# Patient Record
Sex: MALE | Race: BLACK OR AFRICAN AMERICAN | NOT HISPANIC OR LATINO
[De-identification: names, ages, dates, MRNs, and addresses within clinical notes are randomized per-mention and may not be internally consistent; named-entity substitution may affect disease eponyms.]

---

## 2017-12-12 PROBLEM — Z00.00 ENCOUNTER FOR PREVENTIVE HEALTH EXAMINATION: Status: ACTIVE | Noted: 2017-12-12

## 2017-12-13 ENCOUNTER — APPOINTMENT (OUTPATIENT)
Dept: UROLOGY | Facility: CLINIC | Age: 77
End: 2017-12-13
Payer: MEDICARE

## 2017-12-13 VITALS
HEIGHT: 70 IN | DIASTOLIC BLOOD PRESSURE: 78 MMHG | SYSTOLIC BLOOD PRESSURE: 138 MMHG | BODY MASS INDEX: 26.34 KG/M2 | WEIGHT: 184 LBS | HEART RATE: 84 BPM | TEMPERATURE: 97.7 F | RESPIRATION RATE: 16 BRPM

## 2017-12-13 DIAGNOSIS — Z87.891 PERSONAL HISTORY OF NICOTINE DEPENDENCE: ICD-10-CM

## 2017-12-13 DIAGNOSIS — Z78.9 OTHER SPECIFIED HEALTH STATUS: ICD-10-CM

## 2017-12-13 DIAGNOSIS — Z85.46 PERSONAL HISTORY OF MALIGNANT NEOPLASM OF PROSTATE: ICD-10-CM

## 2017-12-13 DIAGNOSIS — Z86.79 PERSONAL HISTORY OF OTHER DISEASES OF THE CIRCULATORY SYSTEM: ICD-10-CM

## 2017-12-13 PROCEDURE — 99214 OFFICE O/P EST MOD 30 MIN: CPT

## 2017-12-13 RX ORDER — TAMSULOSIN HYDROCHLORIDE 0.4 MG/1
0.4 CAPSULE ORAL
Qty: 90 | Refills: 3 | Status: ACTIVE | COMMUNITY
Start: 2017-12-13 | End: 1900-01-01

## 2017-12-15 PROBLEM — Z85.46 HISTORY OF MALIGNANT NEOPLASM OF PROSTATE: Status: RESOLVED | Noted: 2017-12-13 | Resolved: 2017-12-15

## 2017-12-15 PROBLEM — Z78.9 SOCIAL ALCOHOL USE: Status: ACTIVE | Noted: 2017-12-13

## 2017-12-15 PROBLEM — Z86.79 HISTORY OF HYPERTENSION: Status: RESOLVED | Noted: 2017-12-13 | Resolved: 2017-12-15

## 2017-12-15 PROBLEM — Z87.891 FORMER SMOKER: Status: ACTIVE | Noted: 2017-12-13

## 2017-12-15 RX ORDER — HYDROCORTISONE 2.5% 25 MG/G
2.5 CREAM TOPICAL
Qty: 30 | Refills: 0 | Status: ACTIVE | COMMUNITY
Start: 2017-07-03

## 2017-12-15 RX ORDER — SIMVASTATIN 40 MG/1
TABLET, FILM COATED ORAL
Refills: 0 | Status: ACTIVE | COMMUNITY

## 2017-12-15 RX ORDER — DILTIAZEM HYDROCHLORIDE 60 MG/1
TABLET, COATED ORAL
Refills: 0 | Status: ACTIVE | COMMUNITY

## 2017-12-15 RX ORDER — TAMSULOSIN HYDROCHLORIDE 0.4 MG/1
0.4 CAPSULE ORAL
Qty: 30 | Refills: 0 | Status: ACTIVE | COMMUNITY
Start: 2017-06-21

## 2017-12-15 RX ORDER — SIMVASTATIN 10 MG/1
10 TABLET, FILM COATED ORAL
Qty: 90 | Refills: 0 | Status: ACTIVE | COMMUNITY
Start: 2017-08-04

## 2018-04-13 ENCOUNTER — APPOINTMENT (OUTPATIENT)
Dept: UROLOGY | Facility: CLINIC | Age: 78
End: 2018-04-13
Payer: MEDICARE

## 2018-04-13 VITALS — WEIGHT: 185 LBS | BODY MASS INDEX: 26.48 KG/M2 | HEIGHT: 70 IN

## 2018-04-13 PROCEDURE — 99213 OFFICE O/P EST LOW 20 MIN: CPT

## 2018-08-01 ENCOUNTER — INPATIENT (INPATIENT)
Facility: HOSPITAL | Age: 78
LOS: 5 days | Discharge: HOME | DRG: 378 | End: 2018-08-07
Attending: HOSPITALIST | Admitting: HOSPITALIST
Payer: COMMERCIAL

## 2018-08-01 VITALS
WEIGHT: 184.97 LBS | HEIGHT: 70 IN | HEART RATE: 103 BPM | DIASTOLIC BLOOD PRESSURE: 90 MMHG | TEMPERATURE: 98 F | OXYGEN SATURATION: 98 % | RESPIRATION RATE: 16 BRPM | SYSTOLIC BLOOD PRESSURE: 179 MMHG

## 2018-08-01 DIAGNOSIS — K62.5 HEMORRHAGE OF ANUS AND RECTUM: ICD-10-CM

## 2018-08-01 DIAGNOSIS — I10 ESSENTIAL (PRIMARY) HYPERTENSION: ICD-10-CM

## 2018-08-01 DIAGNOSIS — Z29.9 ENCOUNTER FOR PROPHYLACTIC MEASURES, UNSPECIFIED: ICD-10-CM

## 2018-08-01 DIAGNOSIS — K57.90 DIVERTICULOSIS OF INTESTINE, PART UNSPECIFIED, WITHOUT PERFORATION OR ABSCESS WITHOUT BLEEDING: ICD-10-CM

## 2018-08-01 DIAGNOSIS — K92.2 GASTROINTESTINAL HEMORRHAGE, UNSPECIFIED: ICD-10-CM

## 2018-08-01 LAB
24R-OH-CALCIDIOL SERPL-MCNC: 35.1 NG/ML — SIGNIFICANT CHANGE UP (ref 30–80)
24R-OH-CALCIDIOL SERPL-MCNC: 35.6 NG/ML — SIGNIFICANT CHANGE UP (ref 30–80)
ALBUMIN SERPL ELPH-MCNC: 3.8 G/DL — SIGNIFICANT CHANGE UP (ref 3.5–5)
ALBUMIN SERPL ELPH-MCNC: 4.1 G/DL — SIGNIFICANT CHANGE UP (ref 3.5–5)
ALP SERPL-CCNC: 49 U/L — SIGNIFICANT CHANGE UP (ref 40–120)
ALP SERPL-CCNC: 55 U/L — SIGNIFICANT CHANGE UP (ref 40–120)
ALT FLD-CCNC: 18 U/L DA — SIGNIFICANT CHANGE UP (ref 10–60)
ALT FLD-CCNC: 18 U/L DA — SIGNIFICANT CHANGE UP (ref 10–60)
ANION GAP SERPL CALC-SCNC: 6 MMOL/L — SIGNIFICANT CHANGE UP (ref 5–17)
ANION GAP SERPL CALC-SCNC: 7 MMOL/L — SIGNIFICANT CHANGE UP (ref 5–17)
APTT BLD: 31.4 SEC — SIGNIFICANT CHANGE UP (ref 27.5–37.4)
AST SERPL-CCNC: 17 U/L — SIGNIFICANT CHANGE UP (ref 10–40)
AST SERPL-CCNC: 18 U/L — SIGNIFICANT CHANGE UP (ref 10–40)
BASOPHILS # BLD AUTO: 0 K/UL — SIGNIFICANT CHANGE UP (ref 0–0.2)
BASOPHILS # BLD AUTO: 0.1 K/UL — SIGNIFICANT CHANGE UP (ref 0–0.2)
BASOPHILS NFR BLD AUTO: 0.8 % — SIGNIFICANT CHANGE UP (ref 0–2)
BASOPHILS NFR BLD AUTO: 0.9 % — SIGNIFICANT CHANGE UP (ref 0–2)
BILIRUB SERPL-MCNC: 0.3 MG/DL — SIGNIFICANT CHANGE UP (ref 0.2–1.2)
BILIRUB SERPL-MCNC: 0.4 MG/DL — SIGNIFICANT CHANGE UP (ref 0.2–1.2)
BUN SERPL-MCNC: 20 MG/DL — HIGH (ref 7–18)
BUN SERPL-MCNC: 24 MG/DL — HIGH (ref 7–18)
CALCIUM SERPL-MCNC: 8.9 MG/DL — SIGNIFICANT CHANGE UP (ref 8.4–10.5)
CALCIUM SERPL-MCNC: 9.2 MG/DL — SIGNIFICANT CHANGE UP (ref 8.4–10.5)
CHLORIDE SERPL-SCNC: 105 MMOL/L — SIGNIFICANT CHANGE UP (ref 96–108)
CHLORIDE SERPL-SCNC: 106 MMOL/L — SIGNIFICANT CHANGE UP (ref 96–108)
CHOLEST SERPL-MCNC: 129 MG/DL — SIGNIFICANT CHANGE UP (ref 10–199)
CK MB CFR SERPL CALC: 1.1 NG/ML — SIGNIFICANT CHANGE UP (ref 0–3.6)
CK SERPL-CCNC: 144 U/L — SIGNIFICANT CHANGE UP (ref 35–232)
CO2 SERPL-SCNC: 25 MMOL/L — SIGNIFICANT CHANGE UP (ref 22–31)
CO2 SERPL-SCNC: 27 MMOL/L — SIGNIFICANT CHANGE UP (ref 22–31)
CREAT SERPL-MCNC: 0.89 MG/DL — SIGNIFICANT CHANGE UP (ref 0.5–1.3)
CREAT SERPL-MCNC: 1.05 MG/DL — SIGNIFICANT CHANGE UP (ref 0.5–1.3)
EOSINOPHIL # BLD AUTO: 0 K/UL — SIGNIFICANT CHANGE UP (ref 0–0.5)
EOSINOPHIL # BLD AUTO: 0 K/UL — SIGNIFICANT CHANGE UP (ref 0–0.5)
EOSINOPHIL NFR BLD AUTO: 0.2 % — SIGNIFICANT CHANGE UP (ref 0–6)
EOSINOPHIL NFR BLD AUTO: 0.3 % — SIGNIFICANT CHANGE UP (ref 0–6)
FOLATE SERPL-MCNC: >20 NG/ML — SIGNIFICANT CHANGE UP
GLUCOSE SERPL-MCNC: 112 MG/DL — HIGH (ref 70–99)
GLUCOSE SERPL-MCNC: 141 MG/DL — HIGH (ref 70–99)
HBA1C BLD-MCNC: 5.9 % — HIGH (ref 4–5.6)
HCT VFR BLD CALC: 27 % — LOW (ref 39–50)
HCT VFR BLD CALC: 34.4 % — LOW (ref 39–50)
HCT VFR BLD CALC: 34.6 % — LOW (ref 39–50)
HCT VFR BLD CALC: 38.8 % — LOW (ref 39–50)
HDLC SERPL-MCNC: 69 MG/DL — SIGNIFICANT CHANGE UP (ref 40–125)
HGB BLD-MCNC: 11.4 G/DL — LOW (ref 13–17)
HGB BLD-MCNC: 11.5 G/DL — LOW (ref 13–17)
HGB BLD-MCNC: 12.8 G/DL — LOW (ref 13–17)
HGB BLD-MCNC: 8.9 G/DL — LOW (ref 13–17)
INR BLD: 1.13 RATIO — SIGNIFICANT CHANGE UP (ref 0.88–1.16)
LIDOCAIN IGE QN: 116 U/L — SIGNIFICANT CHANGE UP (ref 73–393)
LIPID PNL WITH DIRECT LDL SERPL: 53 MG/DL — SIGNIFICANT CHANGE UP
LYMPHOCYTES # BLD AUTO: 0.9 K/UL — LOW (ref 1–3.3)
LYMPHOCYTES # BLD AUTO: 1.5 K/UL — SIGNIFICANT CHANGE UP (ref 1–3.3)
LYMPHOCYTES # BLD AUTO: 17.8 % — SIGNIFICANT CHANGE UP (ref 13–44)
LYMPHOCYTES # BLD AUTO: 23.3 % — SIGNIFICANT CHANGE UP (ref 13–44)
MAGNESIUM SERPL-MCNC: 2 MG/DL — SIGNIFICANT CHANGE UP (ref 1.6–2.6)
MCHC RBC-ENTMCNC: 31.8 PG — SIGNIFICANT CHANGE UP (ref 27–34)
MCHC RBC-ENTMCNC: 31.9 PG — SIGNIFICANT CHANGE UP (ref 27–34)
MCHC RBC-ENTMCNC: 32.1 PG — SIGNIFICANT CHANGE UP (ref 27–34)
MCHC RBC-ENTMCNC: 32.3 PG — SIGNIFICANT CHANGE UP (ref 27–34)
MCHC RBC-ENTMCNC: 32.8 GM/DL — SIGNIFICANT CHANGE UP (ref 32–36)
MCHC RBC-ENTMCNC: 32.9 GM/DL — SIGNIFICANT CHANGE UP (ref 32–36)
MCHC RBC-ENTMCNC: 33.2 GM/DL — SIGNIFICANT CHANGE UP (ref 32–36)
MCHC RBC-ENTMCNC: 33.2 GM/DL — SIGNIFICANT CHANGE UP (ref 32–36)
MCV RBC AUTO: 96.7 FL — SIGNIFICANT CHANGE UP (ref 80–100)
MCV RBC AUTO: 96.8 FL — SIGNIFICANT CHANGE UP (ref 80–100)
MCV RBC AUTO: 96.9 FL — SIGNIFICANT CHANGE UP (ref 80–100)
MCV RBC AUTO: 97.3 FL — SIGNIFICANT CHANGE UP (ref 80–100)
MONOCYTES # BLD AUTO: 0.4 K/UL — SIGNIFICANT CHANGE UP (ref 0–0.9)
MONOCYTES # BLD AUTO: 0.5 K/UL — SIGNIFICANT CHANGE UP (ref 0–0.9)
MONOCYTES NFR BLD AUTO: 7.7 % — SIGNIFICANT CHANGE UP (ref 2–14)
MONOCYTES NFR BLD AUTO: 8.3 % — SIGNIFICANT CHANGE UP (ref 2–14)
NEUTROPHILS # BLD AUTO: 3.8 K/UL — SIGNIFICANT CHANGE UP (ref 1.8–7.4)
NEUTROPHILS # BLD AUTO: 4.3 K/UL — SIGNIFICANT CHANGE UP (ref 1.8–7.4)
NEUTROPHILS NFR BLD AUTO: 67.3 % — SIGNIFICANT CHANGE UP (ref 43–77)
NEUTROPHILS NFR BLD AUTO: 73.5 % — SIGNIFICANT CHANGE UP (ref 43–77)
PHOSPHATE SERPL-MCNC: 2.9 MG/DL — SIGNIFICANT CHANGE UP (ref 2.5–4.5)
PLATELET # BLD AUTO: 147 K/UL — LOW (ref 150–400)
PLATELET # BLD AUTO: 166 K/UL — SIGNIFICANT CHANGE UP (ref 150–400)
PLATELET # BLD AUTO: 169 K/UL — SIGNIFICANT CHANGE UP (ref 150–400)
PLATELET # BLD AUTO: 195 K/UL — SIGNIFICANT CHANGE UP (ref 150–400)
POTASSIUM SERPL-MCNC: 3.8 MMOL/L — SIGNIFICANT CHANGE UP (ref 3.5–5.3)
POTASSIUM SERPL-MCNC: 4 MMOL/L — SIGNIFICANT CHANGE UP (ref 3.5–5.3)
POTASSIUM SERPL-SCNC: 3.8 MMOL/L — SIGNIFICANT CHANGE UP (ref 3.5–5.3)
POTASSIUM SERPL-SCNC: 4 MMOL/L — SIGNIFICANT CHANGE UP (ref 3.5–5.3)
PROT SERPL-MCNC: 7.4 G/DL — SIGNIFICANT CHANGE UP (ref 6–8.3)
PROT SERPL-MCNC: 8.2 G/DL — SIGNIFICANT CHANGE UP (ref 6–8.3)
PROTHROM AB SERPL-ACNC: 12.3 SEC — SIGNIFICANT CHANGE UP (ref 9.8–12.7)
RBC # BLD: 2.8 M/UL — LOW (ref 4.2–5.8)
RBC # BLD: 3.53 M/UL — LOW (ref 4.2–5.8)
RBC # BLD: 3.58 M/UL — LOW (ref 4.2–5.8)
RBC # BLD: 4 M/UL — LOW (ref 4.2–5.8)
RBC # FLD: 11.7 % — SIGNIFICANT CHANGE UP (ref 10.3–14.5)
RBC # FLD: 11.9 % — SIGNIFICANT CHANGE UP (ref 10.3–14.5)
RBC # FLD: 12 % — SIGNIFICANT CHANGE UP (ref 10.3–14.5)
RBC # FLD: 12 % — SIGNIFICANT CHANGE UP (ref 10.3–14.5)
SODIUM SERPL-SCNC: 137 MMOL/L — SIGNIFICANT CHANGE UP (ref 135–145)
SODIUM SERPL-SCNC: 139 MMOL/L — SIGNIFICANT CHANGE UP (ref 135–145)
TOTAL CHOLESTEROL/HDL RATIO MEASUREMENT: 1.9 RATIO — LOW (ref 3.4–9.6)
TRIGL SERPL-MCNC: 33 MG/DL — SIGNIFICANT CHANGE UP (ref 10–149)
TROPONIN I SERPL-MCNC: 0.06 NG/ML — HIGH (ref 0–0.04)
TROPONIN I SERPL-MCNC: 0.07 NG/ML — HIGH (ref 0–0.04)
TSH SERPL-MCNC: 0.58 UU/ML — SIGNIFICANT CHANGE UP (ref 0.34–4.82)
VIT B12 SERPL-MCNC: 1238 PG/ML — SIGNIFICANT CHANGE UP (ref 232–1245)
WBC # BLD: 4.8 K/UL — SIGNIFICANT CHANGE UP (ref 3.8–10.5)
WBC # BLD: 5 K/UL — SIGNIFICANT CHANGE UP (ref 3.8–10.5)
WBC # BLD: 5.2 K/UL — SIGNIFICANT CHANGE UP (ref 3.8–10.5)
WBC # BLD: 6.3 K/UL — SIGNIFICANT CHANGE UP (ref 3.8–10.5)
WBC # FLD AUTO: 4.8 K/UL — SIGNIFICANT CHANGE UP (ref 3.8–10.5)
WBC # FLD AUTO: 5 K/UL — SIGNIFICANT CHANGE UP (ref 3.8–10.5)
WBC # FLD AUTO: 5.2 K/UL — SIGNIFICANT CHANGE UP (ref 3.8–10.5)
WBC # FLD AUTO: 6.3 K/UL — SIGNIFICANT CHANGE UP (ref 3.8–10.5)

## 2018-08-01 PROCEDURE — 45382 COLONOSCOPY W/CONTROL BLEED: CPT

## 2018-08-01 PROCEDURE — 99285 EMERGENCY DEPT VISIT HI MDM: CPT

## 2018-08-01 PROCEDURE — 99223 1ST HOSP IP/OBS HIGH 75: CPT | Mod: 25

## 2018-08-01 PROCEDURE — 99223 1ST HOSP IP/OBS HIGH 75: CPT | Mod: GC

## 2018-08-01 PROCEDURE — 74177 CT ABD & PELVIS W/CONTRAST: CPT | Mod: 26

## 2018-08-01 RX ORDER — DILTIAZEM HCL 120 MG
120 CAPSULE, EXT RELEASE 24 HR ORAL DAILY
Qty: 0 | Refills: 0 | Status: DISCONTINUED | OUTPATIENT
Start: 2018-08-01 | End: 2018-08-07

## 2018-08-01 RX ORDER — SODIUM CHLORIDE 9 MG/ML
1000 INJECTION, SOLUTION INTRAVENOUS
Qty: 0 | Refills: 0 | Status: DISCONTINUED | OUTPATIENT
Start: 2018-08-01 | End: 2018-08-02

## 2018-08-01 RX ORDER — DILTIAZEM HCL 120 MG
120 CAPSULE, EXT RELEASE 24 HR ORAL DAILY
Qty: 0 | Refills: 0 | Status: DISCONTINUED | OUTPATIENT
Start: 2018-08-01 | End: 2018-08-01

## 2018-08-01 RX ORDER — SODIUM CHLORIDE 9 MG/ML
1000 INJECTION, SOLUTION INTRAVENOUS
Qty: 0 | Refills: 0 | Status: DISCONTINUED | OUTPATIENT
Start: 2018-08-01 | End: 2018-08-01

## 2018-08-01 RX ORDER — SODIUM CHLORIDE 9 MG/ML
1000 INJECTION INTRAMUSCULAR; INTRAVENOUS; SUBCUTANEOUS ONCE
Qty: 0 | Refills: 0 | Status: COMPLETED | OUTPATIENT
Start: 2018-08-01 | End: 2018-08-01

## 2018-08-01 RX ORDER — SOD SULF/SODIUM/NAHCO3/KCL/PEG
4000 SOLUTION, RECONSTITUTED, ORAL ORAL ONCE
Qty: 0 | Refills: 0 | Status: COMPLETED | OUTPATIENT
Start: 2018-08-01 | End: 2018-08-01

## 2018-08-01 RX ORDER — METOPROLOL TARTRATE 50 MG
50 TABLET ORAL
Qty: 0 | Refills: 0 | Status: DISCONTINUED | OUTPATIENT
Start: 2018-08-01 | End: 2018-08-01

## 2018-08-01 RX ORDER — PANTOPRAZOLE SODIUM 20 MG/1
40 TABLET, DELAYED RELEASE ORAL DAILY
Qty: 0 | Refills: 0 | Status: DISCONTINUED | OUTPATIENT
Start: 2018-08-01 | End: 2018-08-02

## 2018-08-01 RX ADMIN — Medication 4000 MILLILITER(S): at 09:15

## 2018-08-01 RX ADMIN — PANTOPRAZOLE SODIUM 40 MILLIGRAM(S): 20 TABLET, DELAYED RELEASE ORAL at 11:36

## 2018-08-01 RX ADMIN — SODIUM CHLORIDE 1000 MILLILITER(S): 9 INJECTION INTRAMUSCULAR; INTRAVENOUS; SUBCUTANEOUS at 02:20

## 2018-08-01 RX ADMIN — SODIUM CHLORIDE 75 MILLILITER(S): 9 INJECTION, SOLUTION INTRAVENOUS at 05:57

## 2018-08-01 RX ADMIN — SODIUM CHLORIDE 100 MILLILITER(S): 9 INJECTION, SOLUTION INTRAVENOUS at 11:38

## 2018-08-01 RX ADMIN — Medication 120 MILLIGRAM(S): at 18:59

## 2018-08-01 RX ADMIN — SODIUM CHLORIDE 100 MILLILITER(S): 9 INJECTION, SOLUTION INTRAVENOUS at 17:45

## 2018-08-01 NOTE — H&P ADULT - PROBLEM SELECTOR PLAN 4
RISK                                                          Points  [  ] Previous VTE                                                3  [  ] Thrombophilia                                             2  [  ] Lower limb paralysis                                   2        (unable to hold up >15 seconds)    [  ] Current Cancer                                             2         (within 6 months)  [ x ] Immobilization > 24 hrs                              1  [  ] ICU/CCU stay > 24 hours                             1  [ x ] Age > 60                                                         1    IMPROVE VTE Score: 2  HOlding DVT prophylaxis due to Bleeding

## 2018-08-01 NOTE — H&P ADULT - HISTORY OF PRESENT ILLNESS
78 yr old M from home, lives alone, ambulates independently with PMH of Pre diabetes, hx of skip beats??? Diverticulosis, Htn, Prostate cancer s/p radiation 1 year ago, internal hemorrhoid,  came with complain of Bright red blood per rectum x 1 day. Patient states he has one episode of Bleeding in stool at home at night which was Bright in color, was large in quantity, filled the whole toilet bowl, was sudden in onset. He denies abdominal pain, dizziness, chest pain, constipation, palpitations, lightheadedness, diarrhea, dyspnea, fever, chills, urinary or bowel complains. His last meal was vegetable. His diet include high fiber diet. His last colonoscopy was 2 yrs ago that showed diverticulosis and some inflammation. Never had Endoscopy. Patient's Baseline Hemoglobin is 13.0    In ED, patient's vital signs were remarkable for high Bp and tachycardia. Labs were significant for Hemoglobin of 12. Troponin x 1: 0.064,  Orthostatics negative done by me was Lying: BP: 159/81 HR: 87, Sitting 172.83 Pulse 107, Standing 161/98 Pulse 114. Patient had 3 big BRBR BM in ED. CT Abdomen and Pelvis w/ IV Cont (08.01.18 @ 04:55) > The rectosigmoid appears mildly distended with slightly high density liquid stool which may represent blood given history of rectal bleeding. Caliber change at the rectum may be related to underdistention. Diverticulosis without evidence of diverticulitis.  When patient was evaluated he was lying comfortably mildly anxious 78 yr old M from home, lives alone, ambulates independently with PMH of Pre diabetes, hx of skip beats??? Diverticulosis, Htn, Prostate cancer s/p radiation 1 year ago, internal hemorrhoid,  came with complain of Bright red blood per rectum x 1 day. Patient states he has one episode of Bleeding in stool at home at night which was Bright in color, was large in quantity, filled the whole toilet bowl, was sudden in onset. He denies abdominal pain, dizziness, chest pain, constipation, palpitations, lightheadedness, diarrhea, dyspnea, fever, chills, urinary or bowel complains. His last meal was vegetable. His diet include high fiber diet. His last colonoscopy was 2 yrs ago that showed diverticulosis and some inflammation. Never had Endoscopy. Patient's Baseline Hemoglobin is 13.0    In ED, patient's vital signs were remarkable for high Bp and tachycardia. Labs were significant for Hemoglobin of 12. Troponin x 1: 0.064,  Orthostatics positive done by me was Lying: BP: 159/81 HR: 87, Sitting 172.83 Pulse 107, Standing 161/98 Pulse 114. Patient had 3 big BRBR BM in ED. CT Abdomen and Pelvis w/ IV Cont (08.01.18 @ 04:55) > The rectosigmoid appears mildly distended with slightly high density liquid stool which may represent blood given history of rectal bleeding. Caliber change at the rectum may be related to underdistention. Diverticulosis without evidence of diverticulitis.  When patient was evaluated he was lying comfortably mildly anxious

## 2018-08-01 NOTE — PATIENT PROFILE ADULT. - NS TRANSFER PATIENT BELONGINGS
Cell Phone/PDA (specify)/shoes, electric razor, hair clipper, book/Clothing Clothing/Wrist Watch/Cell Phone/PDA (specify)/shoes, electric razor, hair clipper, book

## 2018-08-01 NOTE — ED ADULT NURSE NOTE - OBJECTIVE STATEMENT
presented to ed with a large amount of blood with stool, bright red. no active bleeding noted at this time. bld.drawn and  sent to lab

## 2018-08-01 NOTE — ED PROVIDER NOTE - PROGRESS NOTE DETAILS
Patient had a bowel movement while in ED. Toilet is covered in bright red blood. Patient reports this was a painless bowel movement. No further distress. Type and screen ordered. Patient admitted to medicine service. Attending and resident updated of lab and imaging findings. Patient remains pain free. Patient has elevated troponin. Poor candidate for heparinization due to active bleeding.

## 2018-08-01 NOTE — H&P ADULT - ASSESSMENT
78 yr old M from home, lives alone, ambulates independently with PMH of Pre diabetes, hx of skip beats??? Diverticulosis, Htn, Prostate cancer s/p radiation 1 year ago, internal hemorrhoid,  came with complain of Bright red blood per rectum x 1 day.    In ED, patient's vital signs were remarkable for high Bp and tachycardia. Labs were significant for Hemoglobin of 12. Troponin x 1: 0.064,  Orthostatics positive done by me was Lying: BP: 159/81 HR: 87, Sitting 172.83 Pulse 107, Standing 161/98 Pulse 114. Patient had 3 big BRBR BM in ED. CT Abdomen and Pelvis w/ IV Cont (08.01.18 @ 04:55) > The rectosigmoid appears mildly distended with slightly high density liquid stool which may represent blood given history of rectal bleeding. Caliber change at the rectum may be related to underdistention. Diverticulosis without evidence of diverticulitis.

## 2018-08-01 NOTE — CHART NOTE - NSCHARTNOTEFT_GEN_A_CORE
Colonoscopy Report  Indication: Rectal bleeding    Referring:  Dr. Parra   Instrument: # [id number]  Anesthesia: MAC  Consent:  Informed consent was obtained from the patient after providing any opportunity for questions  Procedure: After placing the patient in the left lateral decubitus position, the colonoscope was gently inserted into the rectum and under direct visualization advanced to the cecum. The TI was intubated and examined.  Color, texture, mucosa, and anatomy of the colon were carefully examined with the scope. The patient tolerated the procedure well. After completion of the exam, the patient was transferred to the recovery room.     Preparation: Miralax and bisacodyl. Prep was fair. Total =9    Findings:   Anal Canal	Normal    Rectum	Normal   Sigmoid Colon 	Fresh heme. Multiple large wide mouthed diverticula.   Descending Colon	Fresh heme. Multiple large wide mouthed diverticula. A large clot was identified. The clot was dehisced. A small diverticula was identified with active oozing of heme. Two hemo-clips were placed over the diverticula. The four quadrants around the diverticula was injected with a total of 2.5 ml of EPI (1:10,0000). Hemostasis reached.   Splenic Flexure	Normal  Transverse Colon	Normal  Hepatic Flexure	Normal  Ascending Colon	 Normal  Cecum	Normal  Ileo-cecal Valve	Normal  Ileum 	Normal  Date and time: 8/1/2018 4:43 PM  EBL:0    Impression: 1- Diverticulosis (left sided) 2- Control of bleeding.     Plan: 1- NPO 2- Monitor CBC Q 6 hours 3- IF re-beleeds then CTA?IR consult ? surgical consult.     Procedure Start Time: 16:06    Cecum Reached Time:  16:16  Procedure End Time:   16:32    Total Withdrawal Time:    16    Attending:      Cody Ware M.D.   Date and Time: 8/1/2018 4:43:44 PM

## 2018-08-01 NOTE — CONSULT NOTE ADULT - SUBJECTIVE AND OBJECTIVE BOX
Patient is a 78y old  Male who presents with a chief complaint of BRBPR (01 Aug 2018 06:04)    HPI: 78y Male  presents with a chief complaint of         . The patient has a significant past medical history of           .     REVIEW OF SYSTEMS  Constitutional:   No fever, no fatigue, no pallor, no night sweats, no weight loss.  HEENT:   No eye pain, no vision changes, no icterus, no mouth ulcers.  Respiratory:   No shortness of breath, no cough, no respiratory distress.   Cardiovascular:   No chest pain, no palpitations.   Gastrointestinal: No abdominal pain, no nausea, no vomiting , no diahrrea, no constipation, no hematochezia,no melena.  Skin:   No rashes, no jaundice, no eczema.   Musculoskeletal:   No joint pain, no swelling, no myalgia.   Neurologic:   No headache, no seizure, no weakness.   Genitourinary:   No dysuria, no decreased urine output.  Psychiatric:  No depression, no anxiety,   Endocrine:   No thyroid disease, no diabetes.  Heme/Lymphatic:   No anemia, no blood transfusions, no lymph node enlargement, no bleeding, no bruising.  ___________________________________________________________________________________________  Allergies    lisinopril (Unknown)  losartan (Unknown)    Intolerances      MEDICATIONS  (STANDING):  dextrose 5% + sodium chloride 0.9%. 1000 milliLiter(s) (100 mL/Hr) IV Continuous <Continuous>  diltiazem    milliGRAM(s) Oral daily  pantoprazole  Injectable 40 milliGRAM(s) IV Push daily    MEDICATIONS  (PRN):      PAST MEDICAL & SURGICAL HISTORY:  Skipped beats  Diverticulosis  HTN (hypertension)  No significant past surgical history    FAMILY HISTORY:  No pertinent family history in first degree relatives    Social History: No hsitory of : Tobacco use, IVDA, EToH  ______________________________________________________________________________________    PHYSICAL EXAM    Daily Height in cm: 177.8 (01 Aug 2018 00:30)    Daily   BMI: 26.5 (08-01 @ 00:30)  Change in Weight:  Vital Signs Last 24 Hrs  T(C): 36.5 (01 Aug 2018 11:50), Max: 36.9 (01 Aug 2018 06:28)  T(F): 97.7 (01 Aug 2018 11:50), Max: 98.4 (01 Aug 2018 06:28)  HR: 98 (01 Aug 2018 11:50) (71 - 110)  BP: 140/88 (01 Aug 2018 11:50) (140/88 - 179/90)  BP(mean): --  RR: 18 (01 Aug 2018 11:50) (16 - 20)  SpO2: 98% (01 Aug 2018 11:50) (98% - 100%)    General:  Well developed, well nourished, alert and active, no pallor, NAD.  HEENT:    Normal appearance of conjunctiva, ears, nose, lips, oropharynx, and oral mucosa, anicteric.  Neck:  No masses, no asymmetry.  Lymph Nodes:  No lymphadenopathy.   Cardiovascular:  RRR normal S1/S2, no murmur.  Respiratory:  CTA B/L, normal respiratory effort.   Abdominal:   soft, no masses or tenderness, normoactive BS, NT/ND, no HSM.  Extremities:   No clubbing or cyanosis, normal capillary refill, no edema.   Skin:   No rash, jaundice, lesions, eczema.   Musculoskeletal:  No joint swelling, erythema or tenderness.   Neuro: No focal deficits.   Other:   _______________________________________________________________________________________________  Lab Results:                          11.5   5.0   )-----------( 166      ( 01 Aug 2018 08:56 )             34.6     08-01    137  |  106  |  20<H>  ----------------------------<  141<H>  4.0   |  25  |  0.89    Ca    8.9      01 Aug 2018 08:47  Phos  2.9     08-01  Mg     2.0     08-01    TPro  7.4  /  Alb  3.8  /  TBili  0.4  /  DBili  x   /  AST  18  /  ALT  18  /  AlkPhos  49  08-01    LIVER FUNCTIONS - ( 01 Aug 2018 08:47 )  Alb: 3.8 g/dL / Pro: 7.4 g/dL / ALK PHOS: 49 U/L / ALT: 18 U/L DA / AST: 18 U/L / GGT: x           PT/INR - ( 01 Aug 2018 03:57 )   PT: 12.3 sec;   INR: 1.13 ratio         PTT - ( 01 Aug 2018 03:57 )  PTT:31.4 sec  Triglycerides, Serum: 33 mg/dL (08-01 @ 08:47)    CARDIAC MARKERS ( 01 Aug 2018 08:57 )  x     / x     / 144 U/L / x     / x      CARDIAC MARKERS ( 01 Aug 2018 08:56 )  0.070 ng/mL / x     / x     / x     / 1.1 ng/mL  CARDIAC MARKERS ( 01 Aug 2018 03:57 )  0.064 ng/mL / x     / x     / x     / x          Stool Results:          RADIOLOGY RESULTS:    SURGICAL PATHOLOGY: Patient is a 78y old  Male who presents with a chief complaint of BRBPR (01 Aug 2018 06:04)    78 year old male with a past medical history of prostate CA s.p radiation therapy one year ago presents with multiple episodes of bright red blood per rectum The patient states that he had a routine colonoscopy 3 years ago and was told he had diverticulosis at that time. The night before admission he saw multiple  episodes of bright red blood per rectum with blood clots. EH reports dizziness but no abdominal pain.     REVIEW OF SYSTEMS  Constitutional:   No fever, no fatigue, no pallor, no night sweats, no weight loss.  HEENT:   No eye pain, no vision changes, no icterus, no mouth ulcers.  Respiratory:   No shortness of breath, no cough, no respiratory distress.   Cardiovascular:   No chest pain, no palpitations.   Gastrointestinal: No abdominal pain, no nausea, no vomiting , no diarrhea no constipation, + hematochezia, no melena.  Skin:   No rashes, no jaundice, no eczema.   Musculoskeletal:   No joint pain, no swelling, no myalgia.   Neurologic:   No headache, no seizure, no weakness.   Genitourinary:   No dysuria, no decreased urine output.  Psychiatric:  No depression, no anxiety,   Endocrine:   No thyroid disease, no diabetes.  Heme/Lymphatic:   No anemia, no blood transfusions, no lymph node enlargement, no bleeding, no bruising.  ___________________________________________________________________________________________  Allergies    lisinopril (Unknown)  losartan (Unknown)    Intolerances      MEDICATIONS  (STANDING):  dextrose 5% + sodium chloride 0.9%. 1000 milliLiter(s) (100 mL/Hr) IV Continuous <Continuous>  diltiazem    milliGRAM(s) Oral daily  pantoprazole  Injectable 40 milliGRAM(s) IV Push daily    MEDICATIONS  (PRN):      PAST MEDICAL & SURGICAL HISTORY:  Skipped beats  Diverticulosis  HTN (hypertension)  No significant past surgical history    FAMILY HISTORY:  No pertinent family history in first degree relatives    Social History: No history of : Tobacco use, IVDA, EToH  ______________________________________________________________________________________    PHYSICAL EXAM    Daily Height in cm: 177.8 (01 Aug 2018 00:30)    Daily   BMI: 26.5 (08-01 @ 00:30)  Change in Weight:  Vital Signs Last 24 Hrs  T(C): 36.5 (01 Aug 2018 11:50), Max: 36.9 (01 Aug 2018 06:28)  T(F): 97.7 (01 Aug 2018 11:50), Max: 98.4 (01 Aug 2018 06:28)  HR: 98 (01 Aug 2018 11:50) (71 - 110)  BP: 140/88 (01 Aug 2018 11:50) (140/88 - 179/90)  BP(mean): --  RR: 18 (01 Aug 2018 11:50) (16 - 20)  SpO2: 98% (01 Aug 2018 11:50) (98% - 100%)    General:  Well developed, well nourished, alert and active, no pallor, NAD.  Cardiovascular:  RRR normal S1/S2, no murmur.  Respiratory:  CTA B/L, normal respiratory effort.   Abdominal:   soft, no masses or tenderness, normoactive BS, NT/ND, no HSM.  Extremities:   No clubbing or cyanosis, normal capillary refill, no edema.   Skin:   No rash, jaundice, lesions, eczema.   Musculoskeletal:  No joint swelling, erythema or tenderness.   Neuro: No focal deficits.   Other:   _______________________________________________________________________________________________  Lab Results:                          11.5   5.0   )-----------( 166      ( 01 Aug 2018 08:56 )             34.6     08-01    137  |  106  |  20<H>  ----------------------------<  141<H>  4.0   |  25  |  0.89    Ca    8.9      01 Aug 2018 08:47  Phos  2.9     08-01  Mg     2.0     08-01    TPro  7.4  /  Alb  3.8  /  TBili  0.4  /  DBili  x   /  AST  18  /  ALT  18  /  AlkPhos  49  08-01    LIVER FUNCTIONS - ( 01 Aug 2018 08:47 )  Alb: 3.8 g/dL / Pro: 7.4 g/dL / ALK PHOS: 49 U/L / ALT: 18 U/L DA / AST: 18 U/L / GGT: x           PT/INR - ( 01 Aug 2018 03:57 )   PT: 12.3 sec;   INR: 1.13 ratio         PTT - ( 01 Aug 2018 03:57 )  PTT:31.4 sec  Triglycerides, Serum: 33 mg/dL (08-01 @ 08:47)    CARDIAC MARKERS ( 01 Aug 2018 08:57 )  x     / x     / 144 U/L / x     / x      CARDIAC MARKERS ( 01 Aug 2018 08:56 )  0.070 ng/mL / x     / x     / x     / 1.1 ng/mL  CARDIAC MARKERS ( 01 Aug 2018 03:57 )  0.064 ng/mL / x     / x     / x     / x          Stool Results:          RADIOLOGY RESULTS:    EXAM:  CT ABDOMEN AND PELVIS IC                            PROCEDURE DATE:  08/01/2018          INTERPRETATION:  CLINICAL INFORMATION: Rectal bleeding    COMPARISON: None    PROCEDURE:   CT of the Abdomen and Pelvis was performed with intravenous contrast.   Intravenous contrast: 90 ml Omnipaque 350. 10 ml discarded.  Oral contrast: None.  Sagittal and coronal reformats were performed.    FINDINGS:    LOWER CHEST: Linear atelectasis at the bases.    LIVER: Multiple hypodensities, some of which are cysts and some of which   are too small to characterize.  GALLBLADDER: Within normal limits.  SPLEEN: Within normal limits.  PANCREAS: Within normal limits.  ADRENALS: Within normal limits.  KIDNEYS/URETERS: Multiple renal cysts bilaterally. Additional   hypodensities are too small to characterize. No hydronephrosis    BLADDER: Within normal limits.  REPRODUCTIVE ORGANS: Markers within the prostate which is mildly enlarged    BOWEL: Limited without oral contrast. No evidence of bowel obstruction.   Normal appendix. There are diverticula without definitive diverticulitis.   The rectosigmoid appears mildly distended with slightly high density   liquid stool which may represent blood given history of rectal bleeding  PERITONEUM: No ascites.  VESSELS:  Atherosclerosis  RETROPERITONEUM: No lymphadenopathy.    ABDOMINAL WALL: Within normal limits.  BONES: Degenerative changes    IMPRESSION:    The rectosigmoid appears mildly distended with slightly high density   liquid stool which may represent blood given history of rectal bleeding.   Caliber change at the rectum may be related to underdistention, however   follow-up colonoscopy recommended to exclude an underlying lesion.    Diverticulosis without evidence of diverticulitis                    HENRRY HODGES M.D., ATTENDING RADIOLOGIST  This document has been electronically signed. Aug  1 2018  5:06AM                SURGICAL PATHOLOGY:

## 2018-08-01 NOTE — ED PROVIDER NOTE - OBJECTIVE STATEMENT
77 y/o M w/ PMHx of prostate cancer, treated with radiation therapy, presents today after BM at 2330 with a large amount of blood with stool, bright red, coated to inside of toilet, on top of stool, not mixed. Pt denies any pain, but notes he has had spotting before and has hx of hemorrhoids, but this was painless. Pt denies any nausea, vomiting systemic symptoms like fever, chills, sweats, or any other complaints. Allergy: Losartan, Lisinopril

## 2018-08-01 NOTE — H&P ADULT - PROBLEM SELECTOR PLAN 1
Patient presented with painless BRBPR -- likely DIverticular bleeding  H/H Patient presented with painless BRBPR -- likely Diverticular bleeding  H/H has dropped 1 point from baseline 13 to 12.0  CBC q 6 hr  Monitor for any further bleed  Transfuse if needed  NPO for now  Gi consult Patient presented with painless BRBPR -- likely Diverticular bleeding  H/H has dropped 1 point from baseline 13 to 12.0  CBC q 6 hr  Monitor for any further bleed  Transfuse if needed  NPO for now  Gi consult Dr. Reyna--> Will do rapid Prep and probable colonoscopy in afternoon

## 2018-08-01 NOTE — ED PROVIDER NOTE - MEDICAL DECISION MAKING DETAILS
77 y/o with bright red per rectum. Will plan to check abd labs, INR, PTT, give IV fluids 7 re-assess. Will check CT-scan of abd/pelvis due to hx of cancer.

## 2018-08-01 NOTE — H&P ADULT - NEGATIVE RESPIRATORY AND THORAX SYMPTOMS
no cough/no wheezing/no dyspnea
No adenopathy or splenomegaly. No cervical or inguinal lymphadenopathy.

## 2018-08-01 NOTE — CHART NOTE - NSCHARTNOTEFT_GEN_A_CORE
78 yr old M from home, lives alone, ambulates independently with PMH of Pre diabetes, hx of skip beats??? Diverticulosis, Htn, Prostate cancer s/p radiation 1 year ago, internal hemorrhoid,  came with complain of Bright red blood per rectum x 1 day. Patient states he had one episode of bleeding in stool at home at night which was bright in color, was large in quantity, filled the whole toilet bowl, was sudden in onset. He denies abdominal pain, dizziness, chest pain, constipation, palpitations, lightheadedness, diarrhea, dyspnea, fever, chills, urinary or bowel complains. His last meal was vegetables. His diet includes high fiber. His last colonoscopy was 2 yrs ago that showed diverticulosis and some inflammation. He has never had an endoscopy. Patient's Baseline Hemoglobin is 13.0    In ED, patient's vital signs were remarkable for high Bp and tachycardia. Labs were significant for Hemoglobin of 12. Troponin x 1: 0.064,  Orthostatics positive. Lying: BP: 159/81 HR: 87, Sitting 172.83 Pulse 107, Standing 161/98 Pulse 114. Patient had 3 big BRBR BM in ED. CT Abdomen and Pelvis w/ IV Cont (08.01.18 @ 04:55) > The rectosigmoid appears mildly distended with slightly high density liquid stool which may represent blood given history of rectal bleeding. Caliber change at the rectum may be related to underdistention. Diverticulosis without evidence of diverticulitis.  When patient was evaluated he was lying comfortably in bed.     Patient was admitted for lower GI bleed; likely diverticular. Notified by overnight resident ~830 that per [facility] plan is for colonoscopy today around 1600 and patient to drink Golytely until 10AM. Plan discussed with patient and in agreement. Patient remains stable, endorses continued BM's with red blood. Repeat CBC remains stable :                        11.5   5.0   )-----------( 166      ( 01 Aug 2018 08:56 )             34.6 78 yr old M from home, lives alone, ambulates independently with PMH of Pre diabetes, hx of skip beats??? Diverticulosis, Htn, Prostate cancer s/p radiation 1 year ago, internal hemorrhoid,  came with complain of Bright red blood per rectum x 1 day. Patient states he had one episode of bleeding in stool at home at night which was bright in color, was large in quantity, filled the whole toilet bowl, was sudden in onset. He denies abdominal pain, dizziness, chest pain, constipation, palpitations, lightheadedness, diarrhea, dyspnea, fever, chills, urinary or bowel complains. His last meal was vegetables. His diet includes high fiber. His last colonoscopy was 2 yrs ago that showed diverticulosis and some inflammation. He has never had an endoscopy. Patient's Baseline Hemoglobin is 13.0    In ED, patient's vital signs were remarkable for high Bp and tachycardia. Labs were significant for Hemoglobin of 12. Troponin x 1: 0.064,  Orthostatics positive. Lying: BP: 159/81 HR: 87, Sitting 172.83 Pulse 107, Standing 161/98 Pulse 114. Patient had 3 big BRBR BM in ED. CT Abdomen and Pelvis w/ IV Cont (08.01.18 @ 04:55) > The rectosigmoid appears mildly distended with slightly high density liquid stool which may represent blood given history of rectal bleeding. Caliber change at the rectum may be related to underdistention. Diverticulosis without evidence of diverticulitis.  When patient was evaluated he was lying comfortably in bed.     Patient was admitted for lower GI bleed; likely diverticular. Notified by overnight resident ~830 that per [facility] plan is for colonoscopy today around 1600 and patient to drink Golytely until 10AM. Plan discussed with patient and in agreement. Patient remains stable; NPO on IVF and Protonix. endorses continued BM's with red blood. Repeat CBC remains stable :                        11.5   5.0   )-----------( 166      ( 01 Aug 2018 08:56 )             34.6    Will repeat CBC at 1300.

## 2018-08-01 NOTE — H&P ADULT - ATTENDING COMMENTS
Patient was seen and examined at bedside, feeling ok except for continuing bloody bowel movements. Labs reviewed, Hb on 4/2018 is 12.4.  Repeat H/h noted, plan for colonoscopy today, drank Golytely in am.  Physical exam:  vitals stable  HEENT: Neck supple  Heart: S1 S2 normal  Abdomen: NT, ND  Chest: Clear  LE: no LE edema  A/P  1- Gi bleed: most likely lower; diverticular bleed as it is painless.  IV fluids  Gi input appreciated, pending colonoscopy today  Baseline Hb on April: 12.7    2- HTN:  He is refusing metoprolol, tried it before and leaves an unpleasant feeling.    rest as above.

## 2018-08-01 NOTE — H&P ADULT - NSHPPHYSICALEXAM_GEN_ALL_CORE
Vital Signs Last 24 Hrs  T(C): 36.9 (01 Aug 2018 06:28), Max: 36.9 (01 Aug 2018 06:28)  T(F): 98.4 (01 Aug 2018 06:28), Max: 98.4 (01 Aug 2018 06:28)  HR: 110 (01 Aug 2018 06:28) (103 - 110)  BP: 161/98 (01 Aug 2018 06:28) (161/98 - 179/90)  BP(mean): --  RR: 20 (01 Aug 2018 06:28) (16 - 20)  SpO2: 100% (01 Aug 2018 06:28) (98% - 100%)

## 2018-08-01 NOTE — CONSULT NOTE ADULT - ASSESSMENT
Rectal bleeding   NPO   Large bore IV access   IVF   Monitor CBC Q 4-6 hours   Colonoscopy with Rapid prep today

## 2018-08-01 NOTE — ED ADULT NURSE NOTE - NSIMPLEMENTINTERV_GEN_ALL_ED
Implemented All Universal Safety Interventions:  [place] to call system. Call bell, personal items and telephone within reach. Instruct patient to call for assistance. Room bathroom lighting operational. Non-slip footwear when patient is off stretcher. Physically safe environment: no spills, clutter or unnecessary equipment. Stretcher in lowest position, wheels locked, appropriate side rails in place.

## 2018-08-02 LAB
ANION GAP SERPL CALC-SCNC: 7 MMOL/L — SIGNIFICANT CHANGE UP (ref 5–17)
BUN SERPL-MCNC: 11 MG/DL — SIGNIFICANT CHANGE UP (ref 7–18)
CALCIUM SERPL-MCNC: 8.1 MG/DL — LOW (ref 8.4–10.5)
CHLORIDE SERPL-SCNC: 111 MMOL/L — HIGH (ref 96–108)
CO2 SERPL-SCNC: 26 MMOL/L — SIGNIFICANT CHANGE UP (ref 22–31)
CREAT SERPL-MCNC: 0.81 MG/DL — SIGNIFICANT CHANGE UP (ref 0.5–1.3)
GLUCOSE SERPL-MCNC: 105 MG/DL — HIGH (ref 70–99)
HCT VFR BLD CALC: 24.2 % — LOW (ref 39–50)
HCT VFR BLD CALC: 27.9 % — LOW (ref 39–50)
HCT VFR BLD CALC: 29.8 % — LOW (ref 39–50)
HGB BLD-MCNC: 10 G/DL — LOW (ref 13–17)
HGB BLD-MCNC: 7.8 G/DL — LOW (ref 13–17)
HGB BLD-MCNC: 9.3 G/DL — LOW (ref 13–17)
MCHC RBC-ENTMCNC: 31.4 PG — SIGNIFICANT CHANGE UP (ref 27–34)
MCHC RBC-ENTMCNC: 31.9 PG — SIGNIFICANT CHANGE UP (ref 27–34)
MCHC RBC-ENTMCNC: 32 PG — SIGNIFICANT CHANGE UP (ref 27–34)
MCHC RBC-ENTMCNC: 32.3 GM/DL — SIGNIFICANT CHANGE UP (ref 32–36)
MCHC RBC-ENTMCNC: 33.3 GM/DL — SIGNIFICANT CHANGE UP (ref 32–36)
MCHC RBC-ENTMCNC: 33.4 GM/DL — SIGNIFICANT CHANGE UP (ref 32–36)
MCV RBC AUTO: 95.8 FL — SIGNIFICANT CHANGE UP (ref 80–100)
MCV RBC AUTO: 95.8 FL — SIGNIFICANT CHANGE UP (ref 80–100)
MCV RBC AUTO: 97.4 FL — SIGNIFICANT CHANGE UP (ref 80–100)
PLATELET # BLD AUTO: 128 K/UL — LOW (ref 150–400)
PLATELET # BLD AUTO: 133 K/UL — LOW (ref 150–400)
PLATELET # BLD AUTO: 134 K/UL — LOW (ref 150–400)
POTASSIUM SERPL-MCNC: 3.8 MMOL/L — SIGNIFICANT CHANGE UP (ref 3.5–5.3)
POTASSIUM SERPL-SCNC: 3.8 MMOL/L — SIGNIFICANT CHANGE UP (ref 3.5–5.3)
RBC # BLD: 2.49 M/UL — LOW (ref 4.2–5.8)
RBC # BLD: 2.92 M/UL — LOW (ref 4.2–5.8)
RBC # BLD: 3.12 M/UL — LOW (ref 4.2–5.8)
RBC # FLD: 12.1 % — SIGNIFICANT CHANGE UP (ref 10.3–14.5)
RBC # FLD: 12.6 % — SIGNIFICANT CHANGE UP (ref 10.3–14.5)
RBC # FLD: 13 % — SIGNIFICANT CHANGE UP (ref 10.3–14.5)
SODIUM SERPL-SCNC: 144 MMOL/L — SIGNIFICANT CHANGE UP (ref 135–145)
WBC # BLD: 4 K/UL — SIGNIFICANT CHANGE UP (ref 3.8–10.5)
WBC # BLD: 5.6 K/UL — SIGNIFICANT CHANGE UP (ref 3.8–10.5)
WBC # BLD: 6.8 K/UL — SIGNIFICANT CHANGE UP (ref 3.8–10.5)
WBC # FLD AUTO: 4 K/UL — SIGNIFICANT CHANGE UP (ref 3.8–10.5)
WBC # FLD AUTO: 5.6 K/UL — SIGNIFICANT CHANGE UP (ref 3.8–10.5)
WBC # FLD AUTO: 6.8 K/UL — SIGNIFICANT CHANGE UP (ref 3.8–10.5)

## 2018-08-02 PROCEDURE — 99233 SBSQ HOSP IP/OBS HIGH 50: CPT | Mod: GC

## 2018-08-02 PROCEDURE — 99232 SBSQ HOSP IP/OBS MODERATE 35: CPT

## 2018-08-02 RX ORDER — SODIUM CHLORIDE 9 MG/ML
1000 INJECTION, SOLUTION INTRAVENOUS
Qty: 0 | Refills: 0 | Status: DISCONTINUED | OUTPATIENT
Start: 2018-08-02 | End: 2018-08-05

## 2018-08-02 RX ORDER — PANTOPRAZOLE SODIUM 20 MG/1
40 TABLET, DELAYED RELEASE ORAL
Qty: 0 | Refills: 0 | Status: DISCONTINUED | OUTPATIENT
Start: 2018-08-02 | End: 2018-08-04

## 2018-08-02 RX ADMIN — Medication 120 MILLIGRAM(S): at 12:33

## 2018-08-02 RX ADMIN — PANTOPRAZOLE SODIUM 40 MILLIGRAM(S): 20 TABLET, DELAYED RELEASE ORAL at 18:08

## 2018-08-02 RX ADMIN — SODIUM CHLORIDE 100 MILLILITER(S): 9 INJECTION, SOLUTION INTRAVENOUS at 14:12

## 2018-08-02 NOTE — PROGRESS NOTE ADULT - SUBJECTIVE AND OBJECTIVE BOX
NP Note     Patient is a 78y old  Male who presents with a chief complaint of BRBPR (01 Aug 2018 06:04) Currently no further bleeding. s/p colonoscopy yesterday. noted with drop in h/h, getting prbc.      INTERVAL HPI/OVERNIGHT EVENTS: no new complaints, denies cp/sob/ abdominal pain.    MEDICATIONS  (STANDING):  dextrose 5% + sodium chloride 0.9%. 1000 milliLiter(s) (100 mL/Hr) IV Continuous <Continuous>  diltiazem    milliGRAM(s) Oral daily  pantoprazole  Injectable 40 milliGRAM(s) IV Push daily    MEDICATIONS  (PRN):      __________________________________________________  REVIEW OF SYSTEMS:    CONSTITUTIONAL: No fever,   EYES: no acute visual disturbances  NECK: No pain or stiffness  RESPIRATORY: No cough; No shortness of breath  CARDIOVASCULAR: No chest pain, no palpitations  GASTROINTESTINAL: No pain. No nausea or vomiting; No diarrhea   NEUROLOGICAL: No headache or numbness, no tremors  MUSCULOSKELETAL: No joint pain, no muscle pain  GENITOURINARY: no dysuria, no frequency, no hesitancy  PSYCHIATRY: no depression , no anxiety  ALL OTHER  ROS negative        Vital Signs Last 24 Hrs  T(C): 36.7 (02 Aug 2018 09:55), Max: 36.7 (02 Aug 2018 09:55)  T(F): 98.1 (02 Aug 2018 09:55), Max: 98.1 (02 Aug 2018 09:55)  HR: 68 (02 Aug 2018 09:55) (62 - 88)  BP: 131/60 (02 Aug 2018 09:55) (123/56 - 151/75)  BP(mean): --  RR: 16 (02 Aug 2018 09:55) (14 - 18)  SpO2: 100% (02 Aug 2018 09:55) (99% - 100%)    ________________________________________________  PHYSICAL EXAM:  GENERAL: NAD  HEENT: Normocephalic;  conjunctivae and sclerae clear; moist mucous membranes;   NECK : supple  CHEST/LUNG: Clear to auscultation bilaterally with good air entry   HEART: S1 S2  regular; no murmurs, gallops or rubs  ABDOMEN: Soft, Nontender, Nondistended; Bowel sounds present  EXTREMITIES: no cyanosis; no edema; no calf tenderness  SKIN: warm and dry; no rash  NERVOUS SYSTEM:  Awake and alert; Oriented  to place, person and time ; no new deficits    _________________________________________________  LABS:                        7.8    4.0   )-----------( 133      ( 02 Aug 2018 07:34 )             24.2     08-02    144  |  111<H>  |  11  ----------------------------<  105<H>  3.8   |  26  |  0.81    Ca    8.1<L>      02 Aug 2018 07:34  Phos  2.9     08-01  Mg     2.0     08-01    TPro  7.4  /  Alb  3.8  /  TBili  0.4  /  DBili  x   /  AST  18  /  ALT  18  /  AlkPhos  49  08-01    PT/INR - ( 01 Aug 2018 03:57 )   PT: 12.3 sec;   INR: 1.13 ratio         PTT - ( 01 Aug 2018 03:57 )  PTT:31.4 sec    CAPILLARY BLOOD GLUCOSE            RADIOLOGY & ADDITIONAL TESTS:    Imaging  Reviewed:  YES    Consultant(s) Notes Reviewed:   YES      Plan of care was discussed with patient; all questions and concerns were addressed

## 2018-08-02 NOTE — PROGRESS NOTE ADULT - PROBLEM SELECTOR PLAN 1
drop h/h   prbc infusing  IVF, NPO protonix   hemodynamically stable  CT abd/pelvis  consider surgical consult

## 2018-08-02 NOTE — PROGRESS NOTE ADULT - ASSESSMENT
Diverticular bleeding   Hemostsis reached   Advance diet as otlerate   CBC Q 8 hours   IF REBLEEDS THEN CTA--> IR AND SURGICAL CONSULT. Diverticular bleeding   Hemostsis reached   Advance diet as tolerate   CBC Q 8 hours   IF REBLEEDS THEN CTA--> IR AND SURGICAL CONSULT.

## 2018-08-02 NOTE — PROGRESS NOTE ADULT - SUBJECTIVE AND OBJECTIVE BOX
Subjective:   Doing well, No further episodes of hematochezia H/H dropped post procedure --> receiving one unit of PRBC     Objective:    MEDICATIONS  (STANDING):  dextrose 5% + sodium chloride 0.9%. 1000 milliLiter(s) (100 mL/Hr) IV Continuous <Continuous>  diltiazem    milliGRAM(s) Oral daily  pantoprazole  Injectable 40 milliGRAM(s) IV Push two times a day    MEDICATIONS  (PRN):              Vital Signs Last 24 Hrs  T(C): 36.4 (02 Aug 2018 13:14), Max: 36.7 (02 Aug 2018 09:55)  T(F): 97.5 (02 Aug 2018 13:14), Max: 98.1 (02 Aug 2018 09:55)  HR: 69 (02 Aug 2018 13:14) (62 - 88)  BP: 141/64 (02 Aug 2018 13:14) (123/56 - 151/75)  BP(mean): --  RR: 15 (02 Aug 2018 13:14) (14 - 18)  SpO2: 100% (02 Aug 2018 13:14) (99% - 100%)      General:  Well developed, well nourished, alert and active, no pallor, NAD.  HEENT:    Normal appearance of conjunctiva, ears, nose, lips, oropharynx, and oral mucosa, anicteric.  Neck:  No masses, no asymmetry.  Lymph Nodes:  No lymphadenopathy.   Cardiovascular:  RRR normal S1/S2, no murmur.  Respiratory:  CTA B/L, normal respiratory effort.   Abdominal:   soft, no masses or tenderness, normoactive BS, NT/ND, no HSM.  Extremities:   No clubbing or cyanosis, normal capillary refill, no edema.   Skin:   No rash, jaundice, lesions, eczema.   Musculoskeletal:  No joint swelling, erythema or tenderness.   Neuro: No focal deficits.   Other:       LABS:                        7.8    4.0   )-----------( 133      ( 02 Aug 2018 07:34 )             24.2     08-02    144  |  111<H>  |  11  ----------------------------<  105<H>  3.8   |  26  |  0.81    Ca    8.1<L>      02 Aug 2018 07:34  Phos  2.9     08-01  Mg     2.0     08-01    TPro  7.4  /  Alb  3.8  /  TBili  0.4  /  DBili  x   /  AST  18  /  ALT  18  /  AlkPhos  49  08-01    PT/INR - ( 01 Aug 2018 03:57 )   PT: 12.3 sec;   INR: 1.13 ratio         PTT - ( 01 Aug 2018 03:57 )  PTT:31.4 sec      RADIOLOGY & ADDITIONAL TESTS:

## 2018-08-02 NOTE — PROGRESS NOTE ADULT - ASSESSMENT
78 yr old M from home, lives alone, ambulates independently with PMH of Pre diabetes, hx of skip beats??? Diverticulosis, Htn, Prostate cancer s/p radiation 1 year ago, internal hemorrhoid,  came with complain of Bright red blood per rectum x 1 day.  s/p colo- +diverticular bleed

## 2018-08-02 NOTE — PROGRESS NOTE ADULT - ATTENDING COMMENTS
Patient was seen and examined at bedside, feeling ok, no reports of overt BM or bleed since yesterday.  S/p urgent colonoscopy yesterday pertinent for active diverticular bleed which was stopped and hemodynamic stability achieved.  Am CBC pertinent for a H/h drop,

## 2018-08-03 ENCOUNTER — TRANSCRIPTION ENCOUNTER (OUTPATIENT)
Age: 78
End: 2018-08-03

## 2018-08-03 LAB
ANION GAP SERPL CALC-SCNC: 5 MMOL/L — SIGNIFICANT CHANGE UP (ref 5–17)
BUN SERPL-MCNC: 7 MG/DL — SIGNIFICANT CHANGE UP (ref 7–18)
CALCIUM SERPL-MCNC: 8.6 MG/DL — SIGNIFICANT CHANGE UP (ref 8.4–10.5)
CHLORIDE SERPL-SCNC: 110 MMOL/L — HIGH (ref 96–108)
CO2 SERPL-SCNC: 28 MMOL/L — SIGNIFICANT CHANGE UP (ref 22–31)
CREAT SERPL-MCNC: 0.86 MG/DL — SIGNIFICANT CHANGE UP (ref 0.5–1.3)
GLUCOSE SERPL-MCNC: 122 MG/DL — HIGH (ref 70–99)
HCT VFR BLD CALC: 25.4 % — LOW (ref 39–50)
HCT VFR BLD CALC: 26.7 % — LOW (ref 39–50)
HGB BLD-MCNC: 8.4 G/DL — LOW (ref 13–17)
HGB BLD-MCNC: 8.8 G/DL — LOW (ref 13–17)
MCHC RBC-ENTMCNC: 31.4 PG — SIGNIFICANT CHANGE UP (ref 27–34)
MCHC RBC-ENTMCNC: 31.7 PG — SIGNIFICANT CHANGE UP (ref 27–34)
MCHC RBC-ENTMCNC: 32.9 GM/DL — SIGNIFICANT CHANGE UP (ref 32–36)
MCHC RBC-ENTMCNC: 33.1 GM/DL — SIGNIFICANT CHANGE UP (ref 32–36)
MCV RBC AUTO: 95.4 FL — SIGNIFICANT CHANGE UP (ref 80–100)
MCV RBC AUTO: 95.8 FL — SIGNIFICANT CHANGE UP (ref 80–100)
PLATELET # BLD AUTO: 124 K/UL — LOW (ref 150–400)
PLATELET # BLD AUTO: 127 K/UL — LOW (ref 150–400)
POTASSIUM SERPL-MCNC: 3.9 MMOL/L — SIGNIFICANT CHANGE UP (ref 3.5–5.3)
POTASSIUM SERPL-SCNC: 3.9 MMOL/L — SIGNIFICANT CHANGE UP (ref 3.5–5.3)
RBC # BLD: 2.67 M/UL — LOW (ref 4.2–5.8)
RBC # BLD: 2.78 M/UL — LOW (ref 4.2–5.8)
RBC # FLD: 12.5 % — SIGNIFICANT CHANGE UP (ref 10.3–14.5)
RBC # FLD: 12.8 % — SIGNIFICANT CHANGE UP (ref 10.3–14.5)
SODIUM SERPL-SCNC: 143 MMOL/L — SIGNIFICANT CHANGE UP (ref 135–145)
WBC # BLD: 4.9 K/UL — SIGNIFICANT CHANGE UP (ref 3.8–10.5)
WBC # BLD: 5.1 K/UL — SIGNIFICANT CHANGE UP (ref 3.8–10.5)
WBC # FLD AUTO: 4.9 K/UL — SIGNIFICANT CHANGE UP (ref 3.8–10.5)
WBC # FLD AUTO: 5.1 K/UL — SIGNIFICANT CHANGE UP (ref 3.8–10.5)

## 2018-08-03 PROCEDURE — 99232 SBSQ HOSP IP/OBS MODERATE 35: CPT

## 2018-08-03 RX ORDER — IRON SUCROSE 20 MG/ML
200 INJECTION, SOLUTION INTRAVENOUS EVERY 24 HOURS
Qty: 0 | Refills: 0 | Status: COMPLETED | OUTPATIENT
Start: 2018-08-03 | End: 2018-08-05

## 2018-08-03 RX ADMIN — IRON SUCROSE 110 MILLIGRAM(S): 20 INJECTION, SOLUTION INTRAVENOUS at 13:54

## 2018-08-03 RX ADMIN — PANTOPRAZOLE SODIUM 40 MILLIGRAM(S): 20 TABLET, DELAYED RELEASE ORAL at 17:42

## 2018-08-03 RX ADMIN — PANTOPRAZOLE SODIUM 40 MILLIGRAM(S): 20 TABLET, DELAYED RELEASE ORAL at 05:44

## 2018-08-03 RX ADMIN — Medication 120 MILLIGRAM(S): at 05:44

## 2018-08-03 NOTE — DISCHARGE NOTE ADULT - PROVIDER TOKENS
JULIET:'[id number]' TOKEN:'[id number]',FREE:[LAST:[Vance Montes De Oca MD],FIRST:[Nick],PHONE:[phone]],FAX:[(   )    -]]

## 2018-08-03 NOTE — DISCHARGE NOTE ADULT - CARE PLAN
Principal Discharge DX:	Rectal bleeding  Goal:	prevent bleeding  Assessment and plan of treatment:	You had acute diverticular bleed that was stopped during colonoscopy on 8/1. You received 1 unit prbc.  follow up with dr. Mcwilliams in 1-2 weeks in his office. if y ou notice more bleeding, return to ER.  Secondary Diagnosis:	Diverticulosis  Assessment and plan of treatment:	high fiber diet  Secondary Diagnosis:	HTN (hypertension)  Goal:	130/80  Assessment and plan of treatment:	continue cardizem Principal Discharge DX:	Rectal bleeding  Goal:	prevent bleeding  Assessment and plan of treatment:	You presented with blood in stool with low hemoglobin. You was transfused one unit Packed RBC.   You was seen by Gastroenterologist Dr. Ware. You had acute diverticular bleed that was stopped during colonoscopy on 8/1/18.   Follow up with dr. Mcwilliams in 1-2 weeks in his office. if y ou notice more bleeding, return to ER.  Secondary Diagnosis:	Diverticulosis  Assessment and plan of treatment:	high fiber diet; Avoid constipation.  Secondary Diagnosis:	HTN (hypertension)  Goal:	Target BP less than 130/80 mm Hg  Assessment and plan of treatment:	continue Cardizem Principal Discharge DX:	Rectal bleeding  Goal:	prevent bleeding  Assessment and plan of treatment:	You presented with blood in stool with low hemoglobin. You was transfused one unit Packed RBC.   You was seen by Gastroenterologist Dr. Ware. You had acute diverticular bleed that was stopped during colonoscopy on 8/1/18.   Follow up with dr. Mcwilliams in 1-2 weeks in his office. if y ou notice more bleeding, return to ER.  Secondary Diagnosis:	Diverticulosis  Assessment and plan of treatment:	high fiber diet; Avoid constipation.  continue with Miralax and Colace.  Secondary Diagnosis:	HTN (hypertension)  Goal:	Target BP less than 130/80 mm Hg  Assessment and plan of treatment:	continue Cardizem

## 2018-08-03 NOTE — PROGRESS NOTE ADULT - SUBJECTIVE AND OBJECTIVE BOX
Subjective:   One episode of bright red blood per rectum     Objective:    MEDICATIONS  (STANDING):  dextrose 5% + sodium chloride 0.9%. 1000 milliLiter(s) (100 mL/Hr) IV Continuous <Continuous>  diltiazem    milliGRAM(s) Oral daily  iron sucrose IVPB 200 milliGRAM(s) IV Intermittent every 24 hours  pantoprazole  Injectable 40 milliGRAM(s) IV Push two times a day    MEDICATIONS  (PRN):              Vital Signs Last 24 Hrs  T(C): 36.8 (03 Aug 2018 12:48), Max: 36.9 (02 Aug 2018 20:35)  T(F): 98.3 (03 Aug 2018 12:48), Max: 98.4 (02 Aug 2018 20:35)  HR: 80 (03 Aug 2018 12:48) (71 - 90)  BP: 144/75 (03 Aug 2018 12:48) (144/75 - 158/68)  BP(mean): --  RR: 15 (03 Aug 2018 12:48) (15 - 16)  SpO2: 100% (03 Aug 2018 12:48) (99% - 100%)      General:  Well developed, well nourished, alert and active, no pallor, NAD.  HEENT:    Normal appearance of conjunctiva, ears, nose, lips, oropharynx, and oral mucosa, anicteric.  Neck:  No masses, no asymmetry.  Lymph Nodes:  No lymphadenopathy.   Cardiovascular:  RRR normal S1/S2, no murmur.  Respiratory:  CTA B/L, normal respiratory effort.   Abdominal:   soft, no masses or tenderness, normoactive BS, NT/ND, no HSM.  Extremities:   No clubbing or cyanosis, normal capillary refill, no edema.   Skin:   No rash, jaundice, lesions, eczema.   Musculoskeletal:  No joint swelling, erythema or tenderness.   Neuro: No focal deficits.   Other:       LABS:                        8.8    4.9   )-----------( 127      ( 03 Aug 2018 07:07 )             26.7     08-03    143  |  110<H>  |  7   ----------------------------<  122<H>  3.9   |  28  |  0.86    Ca    8.6      03 Aug 2018 07:07            RADIOLOGY & ADDITIONAL TESTS:

## 2018-08-03 NOTE — DISCHARGE NOTE ADULT - CARE PROVIDERS DIRECT ADDRESSES
[email or web address]

## 2018-08-03 NOTE — DISCHARGE NOTE ADULT - MEDICATION SUMMARY - MEDICATIONS TO TAKE
I will START or STAY ON the medications listed below when I get home from the hospital:    dilTIAZem 120 mg/24 hours oral capsule, extended release  -- 1 cap(s) by mouth once a day  -- Indication: For HTN (hypertension)    docusate sodium 100 mg oral capsule  -- 1 cap(s) by mouth 2 times a day  -- Indication: For constipation    polyethylene glycol 3350 oral powder for reconstitution  -- 17 gram(s) by mouth once a day  -- Indication: For constipation    pantoprazole 40 mg oral delayed release tablet  -- 1 tab(s) by mouth once a day (before a meal)  -- Indication: For GI ppx I will START or STAY ON the medications listed below when I get home from the hospital:    dilTIAZem 120 mg/24 hours oral capsule, extended release  -- 1 cap(s) by mouth once a day  -- Indication: For HTN (hypertension)    doxycycline hyclate 100 mg oral capsule  -- 1 cap(s) by mouth 2 times a day   -- Avoid prolonged or excessive exposure to direct and/or artificial sunlight while taking this medication.  Do not take this drug if you are pregnant.  Finish all this medication unless otherwise directed by prescriber.  Medication should be taken with plenty of water.    -- Indication: For Thrombophlebitis arm    docusate sodium 100 mg oral capsule  -- 1 cap(s) by mouth 2 times a day  -- Indication: For constipation    polyethylene glycol 3350 oral powder for reconstitution  -- 17 gram(s) by mouth once a day  -- Indication: For constipation    pantoprazole 40 mg oral delayed release tablet  -- 1 tab(s) by mouth once a day (before a meal)  -- Indication: For GI ppx

## 2018-08-03 NOTE — PROGRESS NOTE ADULT - ATTENDING COMMENTS
Attending Medicine Covering Dr. Marcin Rivero    Patient was seen and examined at bedside this morning around 11.45 AM, Agree with above NP A/p as edited and stated below. Discussed with NP Laurence    Patient is doing well with no new episodes of bleeding rectally. No chest pain, abdominal pain.     Vital Signs Last 24 Hrs  T(C): 36.7 (03 Aug 2018 20:45), Max: 36.9 (03 Aug 2018 05:33)  T(F): 98.1 (03 Aug 2018 20:45), Max: 98.4 (03 Aug 2018 05:33)  HR: 65 (03 Aug 2018 20:45) (65 - 90)  BP: 111/67 (03 Aug 2018 20:45) (111/67 - 148/66)  RR: 16 (03 Aug 2018 20:45) (15 - 16)  SpO2: 100% (03 Aug 2018 20:45) (99% - 100%)    P/E: As above; NAD    Labs:                        8.8    4.9   )-----------( 127      ( 03 Aug 2018 07:07 )             26.7     D/D:  Lower GI Bleed s/p Diverticular bleed with Acute Blood loss Anemia s/p PRBC  Hx Prostate Ca s/p RT  HTN controlled  Diverticulosis    Plan:  Advance diet; Monitor H/H closely  If H/H remain stable and diet tolerated, possible D/C over the weekend  CBC in AM or repeat if re-bleed    Discussed with NP Laurence and RN   Discussed with patient findings and plan of care

## 2018-08-03 NOTE — DISCHARGE NOTE ADULT - HOSPITAL COURSE
78 yr old M from home, lives alone, ambulates independently with PMH of Pre diabetes, hx of skip beats??? Diverticulosis, Htn, Prostate cancer s/p radiation 1 year ago, internal hemorrhoid,  came with complain of Bright red blood per rectum x 1 day.  s/p colonoscopy with dr. Mcwilliams - +diverticular bleed, hemostasis achieved. S/p 1 unit prbc. H/h stable.  Htn managed with cardizem. Patient remained hemodynamically stable for discharge home with outpatient GI and PMD follow up. 78 yr old M from home, lives alone, ambulates independently with PMH of Pre diabetes, hx of skip beats??? Diverticulosis, Htn, Prostate cancer s/p radiation 1 year ago, internal hemorrhoid,  came with complain of Bright red blood per rectum x 1 day.  s/p colonoscopy with dr. Mcwilliams - +diverticular bleed, hemostasis achieved. S/p 1 unit prbc. H/h stable.  Htn managed with cardizem. Patient remained hemodynamically stable for discharge home with outpatient GI and PMD follow up.  Pt is being treated with Doxycycline for thrombophlebitis of AC space of left arm.  Pt is to continue with Doxy for five more days, as well as applying warm compresses and elevate left forearm.

## 2018-08-03 NOTE — DISCHARGE NOTE ADULT - PATIENT PORTAL LINK FT
You can access the [email or web address] Portal, offered by [facility], by registering with the following website: [email or web address]

## 2018-08-03 NOTE — PROGRESS NOTE ADULT - SUBJECTIVE AND OBJECTIVE BOX
NP Note    Patient is a 78y old  Male who presents with a chief complaint of BRBPR (01 Aug 2018 06:04) Admitted with acute diverticular bleed, s/p colonoscopy emergent with hemostasis accomplished, s/p 1 unit prbc.h/h stable. no further episodes of bleeding reported.      INTERVAL HPI/OVERNIGHT EVENTS: no new complaints, denies cp/sob/abdominal pain. Denies rectal bleeding.     MEDICATIONS  (STANDING):  dextrose 5% + sodium chloride 0.9%. 1000 milliLiter(s) (100 mL/Hr) IV Continuous <Continuous>  diltiazem    milliGRAM(s) Oral daily  pantoprazole  Injectable 40 milliGRAM(s) IV Push two times a day    MEDICATIONS  (PRN):      __________________________________________________  REVIEW OF SYSTEMS:    CONSTITUTIONAL: No fever,   EYES: no acute visual disturbances  NECK: No pain or stiffness  RESPIRATORY: No cough; No shortness of breath  CARDIOVASCULAR: No chest pain, no palpitations  GASTROINTESTINAL: No pain. No nausea or vomiting; No diarrhea   NEUROLOGICAL: No headache or numbness, no tremors  MUSCULOSKELETAL: No joint pain, no muscle pain  GENITOURINARY: no dysuria, no frequency, no hesitancy  PSYCHIATRY: no depression , no anxiety  ALL OTHER  ROS negative        Vital Signs Last 24 Hrs  T(C): 36.9 (03 Aug 2018 05:33), Max: 36.9 (02 Aug 2018 20:35)  T(F): 98.4 (03 Aug 2018 05:33), Max: 98.4 (02 Aug 2018 20:35)  HR: 90 (03 Aug 2018 05:33) (69 - 90)  BP: 148/66 (03 Aug 2018 05:33) (141/64 - 158/68)  BP(mean): --  RR: 16 (03 Aug 2018 05:33) (15 - 16)  SpO2: 99% (03 Aug 2018 05:33) (99% - 100%)    ________________________________________________  PHYSICAL EXAM:  GENERAL: NAD  HEENT: Normocephalic;  conjunctivae and sclerae clear; moist mucous membranes;   NECK : supple  CHEST/LUNG: Clear to auscultation bilaterally with good air entry   HEART: S1 S2  regular; no murmurs, gallops or rubs  ABDOMEN: Soft, Nontender, Nondistended; Bowel sounds present  EXTREMITIES: no cyanosis; no edema; no calf tenderness  SKIN: warm and dry; no rash  NERVOUS SYSTEM:  Awake and alert; Oriented  to place, person and time ; no new deficits    _________________________________________________  LABS:                        8.8    4.9   )-----------( 127      ( 03 Aug 2018 07:07 )             26.7     08-03    143  |  110<H>  |  7   ----------------------------<  122<H>  3.9   |  28  |  0.86    Ca    8.6      03 Aug 2018 07:07          CAPILLARY BLOOD GLUCOSE            RADIOLOGY & ADDITIONAL TESTS:    Imaging  Reviewed:  YES    Consultant(s) Notes Reviewed:   YES      Plan of care was discussed with patient; all questions and concerns were addressed NP Note    Patient is a 78y old  Male who presents with a chief complaint of BRBPR (01 Aug 2018 06:04) Admitted with acute diverticular bleed, s/p colonoscopy emergent with hemostasis accomplished, s/p 1 unit prbc.h/h stable. no further episodes of bleeding reported.      INTERVAL HPI/OVERNIGHT EVENTS: no new complaints, denies cp/sob/abdominal pain. Denies rectal bleeding.     MEDICATIONS  (STANDING):  dextrose 5% + sodium chloride 0.9%. 1000 milliLiter(s) (100 mL/Hr) IV Continuous <Continuous>  diltiazem    milliGRAM(s) Oral daily  pantoprazole  Injectable 40 milliGRAM(s) IV Push two times a day    MEDICATIONS  (PRN):      __________________________________________________  REVIEW OF SYSTEMS:    CONSTITUTIONAL: No fever,   EYES: no acute visual disturbances  NECK: No pain or stiffness  RESPIRATORY: No cough; No shortness of breath  CARDIOVASCULAR: No chest pain, no palpitations  GASTROINTESTINAL: No pain. No nausea or vomiting; No diarrhea   NEUROLOGICAL: No headache or numbness, no tremors  MUSCULOSKELETAL: No joint pain, no muscle pain  GENITOURINARY: no dysuria, no frequency, no hesitancy  PSYCHIATRY: no depression , no anxiety  ALL OTHER  ROS negative        Vital Signs Last 24 Hrs  T(C): 36.9 (03 Aug 2018 05:33), Max: 36.9 (02 Aug 2018 20:35)  T(F): 98.4 (03 Aug 2018 05:33), Max: 98.4 (02 Aug 2018 20:35)  HR: 90 (03 Aug 2018 05:33) (69 - 90)  BP: 148/66 (03 Aug 2018 05:33) (141/64 - 158/68)  RR: 16 (03 Aug 2018 05:33) (15 - 16)  SpO2: 99% (03 Aug 2018 05:33) (99% - 100%)    ________________________________________________  PHYSICAL EXAM:  GENERAL: NAD  HEENT: Normocephalic;  conjunctivae and sclerae clear; moist mucous membranes;   NECK : supple  CHEST/LUNG: Clear to auscultation bilaterally with good air entry   HEART: S1 S2  regular; no murmurs, gallops or rubs  ABDOMEN: Soft, Nontender, Nondistended; Bowel sounds present  EXTREMITIES: no cyanosis; no edema; no calf tenderness  SKIN: warm and dry; no rash  NERVOUS SYSTEM:  Awake and alert; Oriented  to place, person and time ; no new deficits    _________________________________________________  LABS:                        8.8    4.9   )-----------( 127      ( 03 Aug 2018 07:07 )             26.7     08-03    143  |  110<H>  |  7   ----------------------------<  122<H>  3.9   |  28  |  0.86    Ca    8.6      03 Aug 2018 07:07      RADIOLOGY & ADDITIONAL TESTS: No new    Imaging  Reviewed:  YES    Consultant(s) Notes Reviewed:   YES      Plan of care was discussed with patient; all questions and concerns were addressed

## 2018-08-03 NOTE — DISCHARGE NOTE ADULT - ADDITIONAL INSTRUCTIONS
Pt is being treated with Doxycycline for thrombophlebitis of AC space of left arm.  Pt is to continue with Doxy for five more days, as well as applying warm compresses and elevate left forearm.  Pt to following up with PCP in one week.

## 2018-08-03 NOTE — DISCHARGE NOTE ADULT - CARE PROVIDER_API CALL
Cody Ware), Gastroenterology; Internal Medicine  [address] [place] [address]  Phone: [phone]  Fax: [phone] Cody Ware), Gastroenterology; Internal Medicine  [address] [place] [address]  Phone: [phone]  Fax: [phone]    Vance Montes De Oca MD, [facility]  Phone: [phone]  Fax: (       -

## 2018-08-03 NOTE — PROGRESS NOTE ADULT - PROBLEM SELECTOR PLAN 1
hemoglobin around 8, stable.  s/p prbc yesteday  hemodynamically stable  tolerating full liquid diet, advance to regular

## 2018-08-03 NOTE — DISCHARGE NOTE ADULT - PLAN OF CARE
prevent bleeding You had acute diverticular bleed that was stopped during colonoscopy on 8/1. You received 1 unit prbc.  follow up with dr. Mcwilliams in 1-2 weeks in his office. if y ou notice more bleeding, return to ER. high fiber diet 130/80 continue cardizem You presented with blood in stool with low hemoglobin. You was transfused one unit Packed RBC.   You was seen by Gastroenterologist Dr. aWre. You had acute diverticular bleed that was stopped during colonoscopy on 8/1/18.   Follow up with dr. Mcwilliams in 1-2 weeks in his office. if y ou notice more bleeding, return to ER. high fiber diet; Avoid constipation. Target BP less than 130/80 mm Hg continue Cardizem high fiber diet; Avoid constipation.  continue with Miralax and Colace.

## 2018-08-04 DIAGNOSIS — K57.31 DIVERTICULOSIS OF LARGE INTESTINE WITHOUT PERFORATION OR ABSCESS WITH BLEEDING: ICD-10-CM

## 2018-08-04 LAB
HCT VFR BLD CALC: 25.8 % — LOW (ref 39–50)
HCT VFR BLD CALC: 26.4 % — LOW (ref 39–50)
HGB BLD-MCNC: 8.6 G/DL — LOW (ref 13–17)
HGB BLD-MCNC: 8.8 G/DL — LOW (ref 13–17)
MCHC RBC-ENTMCNC: 32 PG — SIGNIFICANT CHANGE UP (ref 27–34)
MCHC RBC-ENTMCNC: 32.1 PG — SIGNIFICANT CHANGE UP (ref 27–34)
MCHC RBC-ENTMCNC: 33.4 GM/DL — SIGNIFICANT CHANGE UP (ref 32–36)
MCHC RBC-ENTMCNC: 33.4 GM/DL — SIGNIFICANT CHANGE UP (ref 32–36)
MCV RBC AUTO: 95.6 FL — SIGNIFICANT CHANGE UP (ref 80–100)
MCV RBC AUTO: 96.3 FL — SIGNIFICANT CHANGE UP (ref 80–100)
PLATELET # BLD AUTO: 129 K/UL — LOW (ref 150–400)
PLATELET # BLD AUTO: 129 K/UL — LOW (ref 150–400)
RBC # BLD: 2.7 M/UL — LOW (ref 4.2–5.8)
RBC # BLD: 2.74 M/UL — LOW (ref 4.2–5.8)
RBC # FLD: 12.5 % — SIGNIFICANT CHANGE UP (ref 10.3–14.5)
RBC # FLD: 12.7 % — SIGNIFICANT CHANGE UP (ref 10.3–14.5)
WBC # BLD: 4.7 K/UL — SIGNIFICANT CHANGE UP (ref 3.8–10.5)
WBC # BLD: 5.2 K/UL — SIGNIFICANT CHANGE UP (ref 3.8–10.5)
WBC # FLD AUTO: 4.7 K/UL — SIGNIFICANT CHANGE UP (ref 3.8–10.5)
WBC # FLD AUTO: 5.2 K/UL — SIGNIFICANT CHANGE UP (ref 3.8–10.5)

## 2018-08-04 PROCEDURE — 99233 SBSQ HOSP IP/OBS HIGH 50: CPT

## 2018-08-04 RX ORDER — DOCUSATE SODIUM 100 MG
100 CAPSULE ORAL DAILY
Qty: 0 | Refills: 0 | Status: DISCONTINUED | OUTPATIENT
Start: 2018-08-04 | End: 2018-08-05

## 2018-08-04 RX ORDER — PANTOPRAZOLE SODIUM 20 MG/1
40 TABLET, DELAYED RELEASE ORAL
Qty: 0 | Refills: 0 | Status: DISCONTINUED | OUTPATIENT
Start: 2018-08-04 | End: 2018-08-07

## 2018-08-04 RX ORDER — SENNA PLUS 8.6 MG/1
2 TABLET ORAL AT BEDTIME
Qty: 0 | Refills: 0 | Status: DISCONTINUED | OUTPATIENT
Start: 2018-08-04 | End: 2018-08-06

## 2018-08-04 RX ADMIN — Medication 100 MILLIGRAM(S): at 22:06

## 2018-08-04 RX ADMIN — SENNA PLUS 2 TABLET(S): 8.6 TABLET ORAL at 22:06

## 2018-08-04 RX ADMIN — IRON SUCROSE 110 MILLIGRAM(S): 20 INJECTION, SOLUTION INTRAVENOUS at 13:30

## 2018-08-04 RX ADMIN — Medication 120 MILLIGRAM(S): at 06:08

## 2018-08-04 RX ADMIN — PANTOPRAZOLE SODIUM 40 MILLIGRAM(S): 20 TABLET, DELAYED RELEASE ORAL at 05:44

## 2018-08-04 NOTE — PROGRESS NOTE ADULT - PROBLEM SELECTOR PLAN 4
on PPI but will decrease to once daily as patient does not have upper GI loss and switch to oral route.   No Heparin or Lovenox due to bleed.

## 2018-08-04 NOTE — PROGRESS NOTE ADULT - SUBJECTIVE AND OBJECTIVE BOX
MEDICAL ATTENDING NOTE  Patient is a 78y old  Male who presents with a chief complaint of Blood in stool (03 Aug 2018 11:14)      INTERVAL HPI/OVERNIGHT EVENTS: Patient had one episode of blood streak with no abdominal pain; No chest pain or SOB or fever; Tolerated diet without any nausea or vomit.     MEDICATIONS  (STANDING):  dextrose 5% + sodium chloride 0.9%. 1000 milliLiter(s) (100 mL/Hr) IV Continuous <Continuous>  diltiazem    milliGRAM(s) Oral daily  iron sucrose IVPB 200 milliGRAM(s) IV Intermittent every 24 hours  pantoprazole  Injectable 40 milliGRAM(s) IV Push two times a day    __________________________________________________  REVIEW OF SYSTEMS:    CONSTITUTIONAL: No fever,   EYES: no acute visual disturbances  NECK: No pain or stiffness  RESPIRATORY: No cough; No shortness of breath  CARDIOVASCULAR: No chest pain, no palpitations  GASTROINTESTINAL: No pain. No nausea or vomiting; One episode of blood P/R  NEUROLOGICAL: No headache or numbness, no tremors  MUSCULOSKELETAL: No joint pain, no muscle pain  GENITOURINARY: no dysuria, no frequency, no hesitancy  PSYCHIATRY: no depression , no anxiety  ALL OTHER  ROS negative        Vital Signs Last 24 Hrs  T(C): 37.1 (04 Aug 2018 14:36), Max: 37.1 (04 Aug 2018 14:36)  T(F): 98.7 (04 Aug 2018 14:36), Max: 98.7 (04 Aug 2018 14:36)  HR: 78 (04 Aug 2018 14:36) (65 - 78)  BP: 151/70 (04 Aug 2018 14:36) (111/67 - 151/70)  RR: 17 (04 Aug 2018 14:36) (16 - 17)  SpO2: 100% (04 Aug 2018 14:36) (100% - 100%)    ________________________________________________  PHYSICAL EXAM:  GENERAL: NAD  HEENT: Normocephalic;  conjunctivae and sclerae clear; moist mucous membranes;   NECK : supple  CHEST/LUNG: Clear to auscultation bilaterally with good air entry   HEART: S1 S2  regular; no murmurs, gallops or rubs  ABDOMEN: Soft, Nontender, Nondistended; Bowel sounds present  EXTREMITIES: no cyanosis; no edema; no calf tenderness  SKIN: warm and dry; no rash  NERVOUS SYSTEM:  Awake and alert; Oriented  to place, person and time ; no new deficits    _________________________________________________  LABS:                        8.8    5.2   )-----------( 129      ( 04 Aug 2018 14:38 )             26.4     08-03    143  |  110<H>  |  7   ----------------------------<  122<H>  3.9   |  28  |  0.86    Ca    8.6      03 Aug 2018 07:07    RADIOLOGY & ADDITIONAL TESTS: No new    Consultant(s) Notes Reviewed:   YES    Care Discussed with Consultants : GI    Plan of care was discussed with patient and /or primary care giver; all questions and concerns were addressed and care was aligned with patient's wishes.

## 2018-08-04 NOTE — PROGRESS NOTE ADULT - ATTENDING COMMENTS
D/C Plan in 24 to 48 hrs if no further blood in stool with Bowel movements  Discussed with patient findings and plan of care  Patient has prior Hx RT for Prostate Ca; ?? Radiation Proctitis;   Will discuss with PGY 3 on call to follow patient closely overnight.

## 2018-08-04 NOTE — PROGRESS NOTE ADULT - PROBLEM SELECTOR PLAN 1
hemoglobin around 8.5 gms; repeated H/H this afternoon stable; hemodynamically stable; diet was advanced yesterday; tolerating regular diet,  Will monitor closely; If bleeds again and drop in H/H will need CT Angiogram and possible IR/ Surgical eval as recommended by GI.   No evidence of active bleeding at this time.

## 2018-08-04 NOTE — PROGRESS NOTE ADULT - ASSESSMENT
78 yr old M from home, lives alone, ambulates independently with PMH of Pre diabetes, hx of skip beats??? Diverticulosis, Htn, Prostate cancer s/p radiation 1 year ago, internal hemorrhoid,  came with complain of Bright red blood per rectum x 1 day.  s/p colonoscopy found to have diverticular bleed, diverticula was clipped and epi injected

## 2018-08-05 LAB
BASOPHILS # BLD AUTO: 0 K/UL — SIGNIFICANT CHANGE UP (ref 0–0.2)
BASOPHILS NFR BLD AUTO: 0.6 % — SIGNIFICANT CHANGE UP (ref 0–2)
EOSINOPHIL # BLD AUTO: 0 K/UL — SIGNIFICANT CHANGE UP (ref 0–0.5)
EOSINOPHIL NFR BLD AUTO: 0.7 % — SIGNIFICANT CHANGE UP (ref 0–6)
HCT VFR BLD CALC: 27.1 % — LOW (ref 39–50)
HCT VFR BLD CALC: 27.3 % — LOW (ref 39–50)
HGB BLD-MCNC: 9.1 G/DL — LOW (ref 13–17)
HGB BLD-MCNC: 9.3 G/DL — LOW (ref 13–17)
LYMPHOCYTES # BLD AUTO: 1 K/UL — SIGNIFICANT CHANGE UP (ref 1–3.3)
LYMPHOCYTES # BLD AUTO: 16.7 % — SIGNIFICANT CHANGE UP (ref 13–44)
MCHC RBC-ENTMCNC: 32.5 PG — SIGNIFICANT CHANGE UP (ref 27–34)
MCHC RBC-ENTMCNC: 32.5 PG — SIGNIFICANT CHANGE UP (ref 27–34)
MCHC RBC-ENTMCNC: 33.8 GM/DL — SIGNIFICANT CHANGE UP (ref 32–36)
MCHC RBC-ENTMCNC: 34.1 GM/DL — SIGNIFICANT CHANGE UP (ref 32–36)
MCV RBC AUTO: 95.4 FL — SIGNIFICANT CHANGE UP (ref 80–100)
MCV RBC AUTO: 96.3 FL — SIGNIFICANT CHANGE UP (ref 80–100)
MONOCYTES # BLD AUTO: 0.6 K/UL — SIGNIFICANT CHANGE UP (ref 0–0.9)
MONOCYTES NFR BLD AUTO: 10.5 % — SIGNIFICANT CHANGE UP (ref 2–14)
NEUTROPHILS # BLD AUTO: 4.2 K/UL — SIGNIFICANT CHANGE UP (ref 1.8–7.4)
NEUTROPHILS NFR BLD AUTO: 71.5 % — SIGNIFICANT CHANGE UP (ref 43–77)
PLATELET # BLD AUTO: 160 K/UL — SIGNIFICANT CHANGE UP (ref 150–400)
PLATELET # BLD AUTO: 161 K/UL — SIGNIFICANT CHANGE UP (ref 150–400)
RBC # BLD: 2.81 M/UL — LOW (ref 4.2–5.8)
RBC # BLD: 2.86 M/UL — LOW (ref 4.2–5.8)
RBC # FLD: 12.4 % — SIGNIFICANT CHANGE UP (ref 10.3–14.5)
RBC # FLD: 12.6 % — SIGNIFICANT CHANGE UP (ref 10.3–14.5)
WBC # BLD: 5.9 K/UL — SIGNIFICANT CHANGE UP (ref 3.8–10.5)
WBC # BLD: 6 K/UL — SIGNIFICANT CHANGE UP (ref 3.8–10.5)
WBC # FLD AUTO: 5.9 K/UL — SIGNIFICANT CHANGE UP (ref 3.8–10.5)
WBC # FLD AUTO: 6 K/UL — SIGNIFICANT CHANGE UP (ref 3.8–10.5)

## 2018-08-05 PROCEDURE — 99233 SBSQ HOSP IP/OBS HIGH 50: CPT

## 2018-08-05 RX ORDER — DOCUSATE SODIUM 100 MG
100 CAPSULE ORAL
Qty: 0 | Refills: 0 | Status: DISCONTINUED | OUTPATIENT
Start: 2018-08-05 | End: 2018-08-07

## 2018-08-05 RX ADMIN — IRON SUCROSE 110 MILLIGRAM(S): 20 INJECTION, SOLUTION INTRAVENOUS at 13:04

## 2018-08-05 RX ADMIN — SENNA PLUS 2 TABLET(S): 8.6 TABLET ORAL at 21:37

## 2018-08-05 RX ADMIN — Medication 100 MILLIGRAM(S): at 11:28

## 2018-08-05 RX ADMIN — PANTOPRAZOLE SODIUM 40 MILLIGRAM(S): 20 TABLET, DELAYED RELEASE ORAL at 05:49

## 2018-08-05 RX ADMIN — Medication 120 MILLIGRAM(S): at 05:49

## 2018-08-05 NOTE — PROGRESS NOTE ADULT - PROBLEM SELECTOR PLAN 4
on PPI but will decrease to once daily as patient does not have upper GI loss and switch to oral route.   No Heparin or Lovenox due to bleed. On PPI oral now  No Heparin or Lovenox due to bleed.

## 2018-08-05 NOTE — PROGRESS NOTE ADULT - PROBLEM SELECTOR PLAN 1
hemoglobin around 8.5 gms; repeated H/H this afternoon stable; hemodynamically stable; diet was advanced yesterday; tolerating regular diet,  Will monitor closely; If bleeds again and drop in H/H will need CT Angiogram and possible IR/ Surgical eval as recommended by GI.   No evidence of active bleeding at this time. repeat H/H this afternoon stable; hemodynamically stable; tolerating regular diet,  Will monitor closely; If bleeds again and drop in H/H will need CT Angiogram and possible IR/ Surgical eval as recommended by GI.   No evidence of active bleeding at this time.

## 2018-08-05 NOTE — PROGRESS NOTE ADULT - ATTENDING COMMENTS
D/C Plan in 24 to 48 hrs if no further blood in stool with Bowel movements  Discussed with patient findings and plan of care  Patient has prior Hx RT for Prostate Ca; ?? Radiation Proctitis;   Will discuss with PGY 3 on call to follow patient closely overnight. D/C Plan in AM if no further blood in stool with Bowel movements  Discussed with patient findings and plan of care emotional support provided about slight blood in stool expected and should worry if see active fresh blood.   Patient has prior Hx RT for Prostate Ca; ?? Radiation Proctitis;   Will discuss with PGY 3 on call to follow patient closely overnight. D/C Plan in AM if no further blood in stool with Bowel movements  Discussed with patient findings and plan of care emotional support provided about slight blood in stool expected and should worry if see active fresh blood.   If active bleed will get CTA; No clinical use if no active bleed and no drop in H/H.    Discussed with RN

## 2018-08-05 NOTE — PROGRESS NOTE ADULT - SUBJECTIVE AND OBJECTIVE BOX
MEDICAL ATTENDING NOTE  Patient is a 78y old  Male who presents with a chief complaint of Blood in stool (03 Aug 2018 11:14)      INTERVAL HPI/OVERNIGHT EVENTS: no new complaints    MEDICATIONS  (STANDING):  diltiazem    milliGRAM(s) Oral daily  docusate sodium 100 milliGRAM(s) Oral two times a day  pantoprazole    Tablet 40 milliGRAM(s) Oral before breakfast  senna 2 Tablet(s) Oral at bedtime    MEDICATIONS  (PRN):      __________________________________________________  REVIEW OF SYSTEMS:    CONSTITUTIONAL: No fever,   EYES: no acute visual disturbances  NECK: No pain or stiffness  RESPIRATORY: No cough; No shortness of breath  CARDIOVASCULAR: No chest pain, no palpitations  GASTROINTESTINAL: No pain. No nausea or vomiting; No diarrhea   NEUROLOGICAL: No headache or numbness, no tremors  MUSCULOSKELETAL: No joint pain, no muscle pain  GENITOURINARY: no dysuria, no frequency, no hesitancy  PSYCHIATRY: no depression , no anxiety  ALL OTHER  ROS negative        Vital Signs Last 24 Hrs  T(C): 36.9 (05 Aug 2018 05:36), Max: 37.1 (04 Aug 2018 14:36)  T(F): 98.5 (05 Aug 2018 05:36), Max: 98.7 (04 Aug 2018 14:36)  HR: 72 (05 Aug 2018 05:36) (62 - 78)  BP: 143/69 (05 Aug 2018 05:36) (143/69 - 151/70)  BP(mean): --  RR: 14 (05 Aug 2018 05:36) (14 - 17)  SpO2: 98% (05 Aug 2018 05:36) (98% - 100%)    ________________________________________________  PHYSICAL EXAM:  GENERAL: NAD  HEENT: Normocephalic;  conjunctivae and sclerae clear; moist mucous membranes;   NECK : supple  CHEST/LUNG: Clear to auscultation bilaterally with good air entry   HEART: S1 S2  regular; no murmurs, gallops or rubs  ABDOMEN: Soft, Nontender, Nondistended; Bowel sounds present  EXTREMITIES: no cyanosis; no edema; no calf tenderness  SKIN: warm and dry; no rash  NERVOUS SYSTEM:  Awake and alert; Oriented  to place, person and time ; no new deficits    _________________________________________________  LABS:                        9.1    5.9   )-----------( 161      ( 05 Aug 2018 12:34 )             27.1               CAPILLARY BLOOD GLUCOSE            RADIOLOGY & ADDITIONAL TESTS:    Imaging Personally Reviewed:  YES/NO    Consultant(s) Notes Reviewed:   YES/ No    Care Discussed with Consultants :     Plan of care was discussed with patient and /or primary care giver; all questions and concerns were addressed and care was aligned with patient's wishes. MEDICAL ATTENDING NOTE  Patient is a 78y old  Male who presents with a chief complaint of Blood in stool (03 Aug 2018 11:14)      INTERVAL HPI/OVERNIGHT EVENTS: no new complaints excpet one episode of Bowel movement with dark stools but no fresh BRBPR. no abdominal pain.     MEDICATIONS  (STANDING):  diltiazem    milliGRAM(s) Oral daily  docusate sodium 100 milliGRAM(s) Oral two times a day  pantoprazole    Tablet 40 milliGRAM(s) Oral before breakfast  senna 2 Tablet(s) Oral at bedtime  __________________________________________________  REVIEW OF SYSTEMS:    CONSTITUTIONAL: No fever,   EYES: no acute visual disturbances  NECK: No pain or stiffness  RESPIRATORY: No cough; No shortness of breath  CARDIOVASCULAR: No chest pain, no palpitations  GASTROINTESTINAL: No pain. No nausea or vomiting; No diarrhea  One BM with dark stool  NEUROLOGICAL: No headache or numbness, no tremors  MUSCULOSKELETAL: No joint pain, no muscle pain  GENITOURINARY: no dysuria, no frequency, no hesitancy  PSYCHIATRY: no depression , no anxiety  ALL OTHER  ROS negative        Vital Signs Last 24 Hrs  T(C): 36.9 (05 Aug 2018 05:36), Max: 37.1 (04 Aug 2018 14:36)  T(F): 98.5 (05 Aug 2018 05:36), Max: 98.7 (04 Aug 2018 14:36)  HR: 72 (05 Aug 2018 05:36) (62 - 78)  BP: 143/69 (05 Aug 2018 05:36) (143/69 - 151/70)  RR: 14 (05 Aug 2018 05:36) (14 - 17)  SpO2: 98% (05 Aug 2018 05:36) (98% - 100%)    ________________________________________________  PHYSICAL EXAM:  GENERAL: NAD  HEENT: Normocephalic;  conjunctivae and sclerae clear; moist mucous membranes;   NECK : supple  CHEST/LUNG: Clear to auscultation bilaterally with good air entry   HEART: S1 S2  regular; no murmurs, gallops or rubs  ABDOMEN: Soft, Nontender, Nondistended; Bowel sounds present  EXTREMITIES: no cyanosis; no edema; no calf tenderness  SKIN: warm and dry; no rash  NERVOUS SYSTEM:  Awake and alert; Oriented  to place, person and time ; no new deficits    _________________________________________________  LABS:                        9.1    5.9   )-----------( 161      ( 05 Aug 2018 12:34 )             27.1       RADIOLOGY & ADDITIONAL TESTS:    Consultant(s) Notes Reviewed:   YES    Care Discussed with Consultants :     Plan of care was discussed with patient and /or primary care giver; all questions and concerns were addressed and care was aligned with patient's wishes.

## 2018-08-06 LAB
HCT VFR BLD CALC: 26.1 % — LOW (ref 39–50)
HGB BLD-MCNC: 8.8 G/DL — LOW (ref 13–17)
MCHC RBC-ENTMCNC: 32.4 PG — SIGNIFICANT CHANGE UP (ref 27–34)
MCHC RBC-ENTMCNC: 33.5 GM/DL — SIGNIFICANT CHANGE UP (ref 32–36)
MCV RBC AUTO: 96.4 FL — SIGNIFICANT CHANGE UP (ref 80–100)
PLATELET # BLD AUTO: 164 K/UL — SIGNIFICANT CHANGE UP (ref 150–400)
RBC # BLD: 2.7 M/UL — LOW (ref 4.2–5.8)
RBC # FLD: 12.8 % — SIGNIFICANT CHANGE UP (ref 10.3–14.5)
WBC # BLD: 7.6 K/UL — SIGNIFICANT CHANGE UP (ref 3.8–10.5)
WBC # FLD AUTO: 7.6 K/UL — SIGNIFICANT CHANGE UP (ref 3.8–10.5)

## 2018-08-06 PROCEDURE — 99232 SBSQ HOSP IP/OBS MODERATE 35: CPT

## 2018-08-06 PROCEDURE — 93971 EXTREMITY STUDY: CPT | Mod: 26,LT

## 2018-08-06 PROCEDURE — 99233 SBSQ HOSP IP/OBS HIGH 50: CPT | Mod: GC

## 2018-08-06 RX ORDER — POLYETHYLENE GLYCOL 3350 17 G/17G
17 POWDER, FOR SOLUTION ORAL DAILY
Qty: 0 | Refills: 0 | Status: DISCONTINUED | OUTPATIENT
Start: 2018-08-06 | End: 2018-08-07

## 2018-08-06 RX ORDER — PANTOPRAZOLE SODIUM 20 MG/1
1 TABLET, DELAYED RELEASE ORAL
Qty: 30 | Refills: 0 | OUTPATIENT
Start: 2018-08-06 | End: 2018-09-04

## 2018-08-06 RX ORDER — DOCUSATE SODIUM 100 MG
1 CAPSULE ORAL
Qty: 0 | Refills: 0 | COMMUNITY
Start: 2018-08-06

## 2018-08-06 RX ORDER — POLYETHYLENE GLYCOL 3350 17 G/17G
17 POWDER, FOR SOLUTION ORAL
Qty: 17 | Refills: 0 | OUTPATIENT
Start: 2018-08-06 | End: 2018-09-04

## 2018-08-06 RX ORDER — DILTIAZEM HCL 120 MG
1 CAPSULE, EXT RELEASE 24 HR ORAL
Qty: 30 | Refills: 0 | OUTPATIENT
Start: 2018-08-06 | End: 2018-09-04

## 2018-08-06 RX ADMIN — Medication 100 MILLIGRAM(S): at 17:44

## 2018-08-06 RX ADMIN — Medication 110 MILLIGRAM(S): at 15:24

## 2018-08-06 RX ADMIN — PANTOPRAZOLE SODIUM 40 MILLIGRAM(S): 20 TABLET, DELAYED RELEASE ORAL at 05:53

## 2018-08-06 RX ADMIN — Medication 100 MILLIGRAM(S): at 05:53

## 2018-08-06 RX ADMIN — Medication 120 MILLIGRAM(S): at 05:53

## 2018-08-06 NOTE — PROGRESS NOTE ADULT - SUBJECTIVE AND OBJECTIVE BOX
Subjective:   No episodes of blood today. Feels constipated.     Objective:    MEDICATIONS  (STANDING):  diltiazem    milliGRAM(s) Oral daily  docusate sodium 100 milliGRAM(s) Oral two times a day  pantoprazole    Tablet 40 milliGRAM(s) Oral before breakfast  senna 2 Tablet(s) Oral at bedtime    MEDICATIONS  (PRN):              Vital Signs Last 24 Hrs  T(C): 37.8 (06 Aug 2018 13:09), Max: 37.8 (06 Aug 2018 13:09)  T(F): 100.1 (06 Aug 2018 13:09), Max: 100.1 (06 Aug 2018 13:09)  HR: 93 (06 Aug 2018 13:09) (79 - 107)  BP: 143/80 (06 Aug 2018 13:09) (115/55 - 155/69)  BP(mean): --  RR: 17 (06 Aug 2018 13:09) (16 - 17)  SpO2: 100% (06 Aug 2018 13:09) (97% - 100%)      General:  Well developed, well nourished, alert and active, no pallor, NAD.  y.  Lymph Nodes:  No lymphadenopathy.   Cardiovascular:  RRR normal S1/S2, no murmur.  Respiratory:  CTA B/L, normal respiratory effort.   Abdominal:   soft, no masses or tenderness, normoactive BS, NT/ND, no HSM.        LABS:                        8.8    7.6   )-----------( 164      ( 06 Aug 2018 07:08 )             26.1                 RADIOLOGY & ADDITIONAL TESTS:

## 2018-08-06 NOTE — CHART NOTE - NSCHARTNOTEFT_GEN_A_CORE
Discharge cancelled due to cellulitis vs. thrombophlebitis in left upper extremity at site of IV line.  Doxycycline started, LUE US ordered, BC X 2 orderd.

## 2018-08-06 NOTE — PROGRESS NOTE ADULT - ASSESSMENT
HPI:  78 yr old M from home, lives alone, ambulates independently with PMH of Pre diabetes, hx of skip beats??? Diverticulosis, Htn, Prostate cancer s/p radiation 1 year ago, internal hemorrhoid,  came with complain of Bright red blood per rectum x 1 day. Patient states he has one episode of Bleeding in stool at home at night which was Bright in color, was large in quantity, filled the whole toilet bowl, was sudden in onset. He denies abdominal pain, dizziness, chest pain, constipation, palpitations, lightheadedness, diarrhea, dyspnea, fever, chills, urinary or bowel complains. His last meal was vegetable. His diet include high fiber diet. His last colonoscopy was 2 yrs ago that showed diverticulosis and some inflammation. Never had Endoscopy. Patient's Baseline Hemoglobin is 13.0    In ED, patient's vital signs were remarkable for high Bp and tachycardia. Labs were significant for Hemoglobin of 12. Troponin x 1: 0.064,  Orthostatics positive done by me was Lying: BP: 159/81 HR: 87, Sitting 172.83 Pulse 107, Standing 161/98 Pulse 114. Patient had 3 big BRBR BM in ED. CT Abdomen and Pelvis w/ IV Cont (08.01.18 @ 04:55) > The rectosigmoid appears mildly distended with slightly high density liquid stool which may represent blood given history of rectal bleeding. Caliber change at the rectum may be related to underdistention. Diverticulosis without evidence of diverticulitis.  When patient was evaluated he was lying comfortably mildly anxious (01 Aug 2018 06:04)

## 2018-08-06 NOTE — PROGRESS NOTE ADULT - PROBLEM SELECTOR PLAN 3
High fiber diet; Monitor for bleed
s/p colonoscopy on 8/1
s/p colonoscopy on 8/1
BP controlled; Continue Cardizem CD
High fiber diet; Monitor for bleed

## 2018-08-06 NOTE — PROGRESS NOTE ADULT - ASSESSMENT
Constipation  Colace   Miralax daily   D/C laxatives     Rectal bleeding:  Hemoglobin remains sable   Advance diet   D>C planning

## 2018-08-06 NOTE — PROGRESS NOTE ADULT - PROBLEM SELECTOR PLAN 2
BP controlled; Continue Cardizem CD
BP on higher side  c/w cardizem
controlled  c/w cardizem
h/h stable,   today s/p BM with no blood.
BP controlled; Continue Cardizem CD

## 2018-08-06 NOTE — PROGRESS NOTE ADULT - PROBLEM SELECTOR PROBLEM 1
Diverticular hemorrhage
Lower GI bleed
Lower GI bleed
R/O Thrombophlebitis arm
Diverticular hemorrhage

## 2018-08-06 NOTE — PROGRESS NOTE ADULT - PROVIDER SPECIALTY LIST ADULT
Gastroenterology
Hospitalist
Internal Medicine
Internal Medicine
Hospitalist
Internal Medicine

## 2018-08-06 NOTE — PROGRESS NOTE ADULT - PROBLEM SELECTOR PLAN 1
Low grade temperature.   Upper extremity doppler to rule out.   started on doxycycline for now, blood cultures pending.

## 2018-08-06 NOTE — PROGRESS NOTE ADULT - SUBJECTIVE AND OBJECTIVE BOX
Patient is a 78y old  Male who presents with a chief complaint of Blood in stool (03 Aug 2018 11:14)    INTERVAL HPI/OVERNIGHT EVENTS:  Patient was seen and examined at bedside, feeling ok  Complaints of pain in his left upper extremity together with warmth and swelling.  had BM with old dark blood yesterday, today had a BM with no blood.    Allergies    lisinopril (Unknown)  losartan (Unknown)    Intolerances    REVIEW OF SYSTEMS:  CONSTITUTIONAL: No fever, weight loss, or fatigue  EYES: No eye pain, visual disturbances, or discharge  RESPIRATORY: No cough, wheezing or shortness of breath  CARDIOVASCULAR: No chest pain, feeling of heart beats  GASTROINTESTINAL: No abdominal or epigastric pain. No nausea, vomiting; No diarrhea or constipation.  GENITOURINARY: No dysuria, frequency, hematuria  NEUROLOGICAL: No headaches  MUSCULOSKELETAL: left arm pain.   PSYCHIATRIC: No depression or anxiety  HEME/LYMPH: No easy bruising, or bleeding gums  ALLERGY AND IMMUNOLOGIC: No hives or eczema    Medications:  diltiazem    milliGRAM(s) Oral daily  docusate sodium 100 milliGRAM(s) Oral two times a day  doxycycline IVPB      pantoprazole    Tablet 40 milliGRAM(s) Oral before breakfast  polyethylene glycol 3350 17 Gram(s) Oral daily      PHYSICAL EXAM:  Vital Signs Last 24 Hrs  T(C): 37.8 (06 Aug 2018 13:09), Max: 37.8 (06 Aug 2018 13:09)  T(F): 100.1 (06 Aug 2018 13:09), Max: 100.1 (06 Aug 2018 13:09)  HR: 93 (06 Aug 2018 13:09) (80 - 107)  BP: 143/80 (06 Aug 2018 13:09) (115/55 - 155/69)  BP(mean): --  RR: 17 (06 Aug 2018 13:09) (16 - 17)  SpO2: 100% (06 Aug 2018 13:09) (97% - 100%)    GENERAL: NAD  HEAD:  Atraumatic, Normocephalic  EYES: EOMI, PERRLA, conjunctiva and sclera clear  ENT: moist mucous membranes  NECK: Supple, No JVD, Normal thyroid  NERVOUS SYSTEM:  Alert & Oriented X3, Good concentration; Motor Strength 5/5 B/L upper and lower extremities; DTRs 2+ intact and symmetric  CHEST/LUNG: No rales, rhonchi, wheezing, or rubs  HEART: Regular rate and rhythm; No murmurs, rubs, or gallops  ABDOMEN: Soft, Nontender, Nondistended; Bowel sounds present  EXTREMITIES: left arm with swelling, redness and warmth.  SKIN: No rashes or lesions    LABS:                        8.8    7.6   )-----------( 164      ( 06 Aug 2018 07:08 )             26.1                       Culture & Sensitivity:   CAPILLARY BLOOD GLUCOSE            RADIOLOGY & ADDITIONAL TESTS:  Radiology testing independently reviewed:     Consultant(s) Notes Reviewed:  [ x] YES  [ ] NO    Care Discussed with Consultants/Other Providers [ x] YES  [ ] NO

## 2018-08-07 VITALS
DIASTOLIC BLOOD PRESSURE: 71 MMHG | HEART RATE: 81 BPM | SYSTOLIC BLOOD PRESSURE: 146 MMHG | RESPIRATION RATE: 16 BRPM | OXYGEN SATURATION: 100 % | TEMPERATURE: 99 F

## 2018-08-07 LAB
ANION GAP SERPL CALC-SCNC: 5 MMOL/L — SIGNIFICANT CHANGE UP (ref 5–17)
BUN SERPL-MCNC: 17 MG/DL — SIGNIFICANT CHANGE UP (ref 7–18)
CALCIUM SERPL-MCNC: 8.5 MG/DL — SIGNIFICANT CHANGE UP (ref 8.4–10.5)
CHLORIDE SERPL-SCNC: 105 MMOL/L — SIGNIFICANT CHANGE UP (ref 96–108)
CO2 SERPL-SCNC: 30 MMOL/L — SIGNIFICANT CHANGE UP (ref 22–31)
CREAT SERPL-MCNC: 0.98 MG/DL — SIGNIFICANT CHANGE UP (ref 0.5–1.3)
GLUCOSE SERPL-MCNC: 114 MG/DL — HIGH (ref 70–99)
HCT VFR BLD CALC: 24.8 % — LOW (ref 39–50)
HGB BLD-MCNC: 8.4 G/DL — LOW (ref 13–17)
MCHC RBC-ENTMCNC: 32.7 PG — SIGNIFICANT CHANGE UP (ref 27–34)
MCHC RBC-ENTMCNC: 33.8 GM/DL — SIGNIFICANT CHANGE UP (ref 32–36)
MCV RBC AUTO: 96.7 FL — SIGNIFICANT CHANGE UP (ref 80–100)
PLATELET # BLD AUTO: 168 K/UL — SIGNIFICANT CHANGE UP (ref 150–400)
POTASSIUM SERPL-MCNC: 3.7 MMOL/L — SIGNIFICANT CHANGE UP (ref 3.5–5.3)
POTASSIUM SERPL-SCNC: 3.7 MMOL/L — SIGNIFICANT CHANGE UP (ref 3.5–5.3)
RBC # BLD: 2.57 M/UL — LOW (ref 4.2–5.8)
RBC # FLD: 12.9 % — SIGNIFICANT CHANGE UP (ref 10.3–14.5)
SODIUM SERPL-SCNC: 140 MMOL/L — SIGNIFICANT CHANGE UP (ref 135–145)
WBC # BLD: 7.3 K/UL — SIGNIFICANT CHANGE UP (ref 3.8–10.5)
WBC # FLD AUTO: 7.3 K/UL — SIGNIFICANT CHANGE UP (ref 3.8–10.5)

## 2018-08-07 PROCEDURE — 93971 EXTREMITY STUDY: CPT

## 2018-08-07 PROCEDURE — 86900 BLOOD TYPING SEROLOGIC ABO: CPT

## 2018-08-07 PROCEDURE — 85610 PROTHROMBIN TIME: CPT

## 2018-08-07 PROCEDURE — 82607 VITAMIN B-12: CPT

## 2018-08-07 PROCEDURE — 99285 EMERGENCY DEPT VISIT HI MDM: CPT | Mod: 25

## 2018-08-07 PROCEDURE — 82553 CREATINE MB FRACTION: CPT

## 2018-08-07 PROCEDURE — 80053 COMPREHEN METABOLIC PANEL: CPT

## 2018-08-07 PROCEDURE — 86923 COMPATIBILITY TEST ELECTRIC: CPT

## 2018-08-07 PROCEDURE — 85027 COMPLETE CBC AUTOMATED: CPT

## 2018-08-07 PROCEDURE — 84100 ASSAY OF PHOSPHORUS: CPT

## 2018-08-07 PROCEDURE — 82550 ASSAY OF CK (CPK): CPT

## 2018-08-07 PROCEDURE — 93005 ELECTROCARDIOGRAM TRACING: CPT

## 2018-08-07 PROCEDURE — 84484 ASSAY OF TROPONIN QUANT: CPT

## 2018-08-07 PROCEDURE — 86850 RBC ANTIBODY SCREEN: CPT

## 2018-08-07 PROCEDURE — C1889: CPT

## 2018-08-07 PROCEDURE — 82746 ASSAY OF FOLIC ACID SERUM: CPT

## 2018-08-07 PROCEDURE — 85730 THROMBOPLASTIN TIME PARTIAL: CPT

## 2018-08-07 PROCEDURE — 80048 BASIC METABOLIC PNL TOTAL CA: CPT

## 2018-08-07 PROCEDURE — 74177 CT ABD & PELVIS W/CONTRAST: CPT

## 2018-08-07 PROCEDURE — 36430 TRANSFUSION BLD/BLD COMPNT: CPT

## 2018-08-07 PROCEDURE — 82306 VITAMIN D 25 HYDROXY: CPT

## 2018-08-07 PROCEDURE — P9040: CPT

## 2018-08-07 PROCEDURE — 86901 BLOOD TYPING SEROLOGIC RH(D): CPT

## 2018-08-07 PROCEDURE — 87040 BLOOD CULTURE FOR BACTERIA: CPT

## 2018-08-07 PROCEDURE — 83036 HEMOGLOBIN GLYCOSYLATED A1C: CPT

## 2018-08-07 PROCEDURE — 84443 ASSAY THYROID STIM HORMONE: CPT

## 2018-08-07 PROCEDURE — 83690 ASSAY OF LIPASE: CPT

## 2018-08-07 PROCEDURE — 80061 LIPID PANEL: CPT

## 2018-08-07 PROCEDURE — 36415 COLL VENOUS BLD VENIPUNCTURE: CPT

## 2018-08-07 PROCEDURE — 83735 ASSAY OF MAGNESIUM: CPT

## 2018-08-07 RX ADMIN — Medication 120 MILLIGRAM(S): at 06:02

## 2018-08-07 RX ADMIN — PANTOPRAZOLE SODIUM 40 MILLIGRAM(S): 20 TABLET, DELAYED RELEASE ORAL at 06:02

## 2018-08-07 RX ADMIN — Medication 100 MILLIGRAM(S): at 06:02

## 2018-08-07 RX ADMIN — Medication 110 MILLIGRAM(S): at 06:02

## 2018-08-12 LAB
CULTURE RESULTS: SIGNIFICANT CHANGE UP
CULTURE RESULTS: SIGNIFICANT CHANGE UP
SPECIMEN SOURCE: SIGNIFICANT CHANGE UP
SPECIMEN SOURCE: SIGNIFICANT CHANGE UP

## 2018-08-13 PROBLEM — K57.90 DIVERTICULOSIS OF INTESTINE, PART UNSPECIFIED, WITHOUT PERFORATION OR ABSCESS WITHOUT BLEEDING: Chronic | Status: ACTIVE | Noted: 2018-08-01

## 2018-08-13 PROBLEM — I45.9 CONDUCTION DISORDER, UNSPECIFIED: Chronic | Status: ACTIVE | Noted: 2018-08-01

## 2018-08-13 PROBLEM — I10 ESSENTIAL (PRIMARY) HYPERTENSION: Chronic | Status: ACTIVE | Noted: 2018-08-01

## 2018-08-14 ENCOUNTER — APPOINTMENT (OUTPATIENT)
Dept: GASTROENTEROLOGY | Facility: CLINIC | Age: 78
End: 2018-08-14
Payer: MEDICARE

## 2018-08-14 VITALS
DIASTOLIC BLOOD PRESSURE: 84 MMHG | SYSTOLIC BLOOD PRESSURE: 156 MMHG | HEIGHT: 70 IN | HEART RATE: 96 BPM | OXYGEN SATURATION: 97 % | RESPIRATION RATE: 16 BRPM | TEMPERATURE: 99.6 F | BODY MASS INDEX: 26.48 KG/M2 | WEIGHT: 185 LBS

## 2018-08-14 DIAGNOSIS — K62.5 HEMORRHAGE OF ANUS AND RECTUM: ICD-10-CM

## 2018-08-14 PROCEDURE — 99204 OFFICE O/P NEW MOD 45 MIN: CPT

## 2018-08-14 RX ORDER — LORATADINE 5 MG
TABLET,CHEWABLE ORAL
Refills: 0 | Status: ACTIVE | COMMUNITY

## 2018-08-14 RX ORDER — DOCUSATE SODIUM 100 MG/1
CAPSULE ORAL
Refills: 0 | Status: ACTIVE | COMMUNITY

## 2018-08-14 RX ORDER — DILTIAZEM HYDROCHLORIDE 120 MG/1
120 CAPSULE, EXTENDED RELEASE ORAL
Refills: 0 | Status: ACTIVE | COMMUNITY

## 2018-08-16 LAB
BASOPHILS # BLD AUTO: 0.01 K/UL
BASOPHILS NFR BLD AUTO: 0.2 %
EOSINOPHIL # BLD AUTO: 0.05 K/UL
EOSINOPHIL NFR BLD AUTO: 1.1 %
HCT VFR BLD CALC: 30.2 %
HGB BLD-MCNC: 9.7 G/DL
IMM GRANULOCYTES NFR BLD AUTO: 0.4 %
LYMPHOCYTES # BLD AUTO: 1.12 K/UL
LYMPHOCYTES NFR BLD AUTO: 23.7 %
MAN DIFF?: NORMAL
MCHC RBC-ENTMCNC: 32.1 GM/DL
MCHC RBC-ENTMCNC: 32.8 PG
MCV RBC AUTO: 102 FL
MONOCYTES # BLD AUTO: 0.5 K/UL
MONOCYTES NFR BLD AUTO: 10.6 %
NEUTROPHILS # BLD AUTO: 3.02 K/UL
NEUTROPHILS NFR BLD AUTO: 64 %
PLATELET # BLD AUTO: 318 K/UL
RBC # BLD: 2.96 M/UL
RBC # FLD: 16.1 %
WBC # FLD AUTO: 4.72 K/UL

## 2018-10-12 ENCOUNTER — APPOINTMENT (OUTPATIENT)
Dept: UROLOGY | Facility: CLINIC | Age: 78
End: 2018-10-12
Payer: MEDICARE

## 2018-10-12 VITALS
HEART RATE: 96 BPM | OXYGEN SATURATION: 98 % | RESPIRATION RATE: 16 BRPM | DIASTOLIC BLOOD PRESSURE: 76 MMHG | SYSTOLIC BLOOD PRESSURE: 144 MMHG

## 2018-10-12 PROCEDURE — 99214 OFFICE O/P EST MOD 30 MIN: CPT

## 2018-10-12 RX ORDER — SILDENAFIL 20 MG/1
20 TABLET ORAL
Qty: 30 | Refills: 1 | Status: ACTIVE | COMMUNITY
Start: 2018-10-12 | End: 1900-01-01

## 2018-11-12 LAB — PSA SERPL-MCNC: 0.21 NG/ML

## 2019-04-12 ENCOUNTER — APPOINTMENT (OUTPATIENT)
Dept: UROLOGY | Facility: CLINIC | Age: 79
End: 2019-04-12
Payer: MEDICARE

## 2019-04-12 VITALS
DIASTOLIC BLOOD PRESSURE: 82 MMHG | BODY MASS INDEX: 26.63 KG/M2 | SYSTOLIC BLOOD PRESSURE: 157 MMHG | HEART RATE: 100 BPM | WEIGHT: 186 LBS | HEIGHT: 70 IN

## 2019-04-12 PROCEDURE — 99214 OFFICE O/P EST MOD 30 MIN: CPT

## 2019-04-12 RX ORDER — SILODOSIN 8 MG/1
8 CAPSULE ORAL
Qty: 30 | Refills: 0 | Status: ACTIVE | COMMUNITY
Start: 2019-04-12 | End: 1900-01-01

## 2019-04-12 NOTE — HISTORY OF PRESENT ILLNESS
[FreeTextEntry1] : cc f/u prostate ca\par [place] 4+3 prostate ca\par \par S/p xrt \par Completed 6/17\par Doing well\par urination improved on tamsulosin but stopped neil to congestion \par psa 0.21 11/18\par poor erections worsening last year difficulty maintaining  \par poor results 40 mg sildenafil libido decreased

## 2019-04-12 NOTE — ASSESSMENT
[FreeTextEntry1] : bph \par will try rapaflo \par side effects reviewed\par \par ed \par check testosterone\par \par \par cap \par check psa

## 2019-04-15 LAB — SHBG SERPL-SCNC: 52 NMOL/L

## 2019-04-25 LAB
TESTOST BND SERPL-MCNC: 2.1 PG/ML
TESTOST SERPL-MCNC: 268.6 NG/DL

## 2019-10-11 ENCOUNTER — APPOINTMENT (OUTPATIENT)
Dept: UROLOGY | Facility: CLINIC | Age: 79
End: 2019-10-11
Payer: MEDICARE

## 2019-10-11 VITALS
HEIGHT: 70 IN | BODY MASS INDEX: 26.63 KG/M2 | DIASTOLIC BLOOD PRESSURE: 79 MMHG | HEART RATE: 89 BPM | WEIGHT: 186 LBS | SYSTOLIC BLOOD PRESSURE: 164 MMHG

## 2019-10-11 PROCEDURE — 99214 OFFICE O/P EST MOD 30 MIN: CPT

## 2019-10-13 NOTE — HISTORY OF PRESENT ILLNESS
[FreeTextEntry1] : cc f/u prostate ca\par [place] 4+3 prostate ca\par \par S/p xrt \par Completed 6/17\par Doing well\par urination improved on tamsulosin but stopped neil to congestion rapaflo still gave orthostatic\par psa 0.21 11/18  0.2 8/19\par poor erections worsening last year difficulty maintaining  \par ok results 80 mg sildenafil libido decreased - test low

## 2019-10-13 NOTE — ASSESSMENT
[FreeTextEntry1] : cap\par psa low stable \par con routine psa\par \par ed \par cont sildenafil  as needed\par \par bph intolerant of alpha blockers\par does not want other meds\par \par

## 2019-12-17 ENCOUNTER — INPATIENT (INPATIENT)
Facility: HOSPITAL | Age: 79
LOS: 2 days | Discharge: HOME | DRG: 390 | End: 2019-12-20
Attending: SURGERY | Admitting: SURGERY
Payer: MEDICARE

## 2019-12-17 VITALS
DIASTOLIC BLOOD PRESSURE: 83 MMHG | SYSTOLIC BLOOD PRESSURE: 170 MMHG | TEMPERATURE: 98 F | RESPIRATION RATE: 16 BRPM | HEART RATE: 83 BPM | HEIGHT: 70 IN | OXYGEN SATURATION: 98 % | WEIGHT: 184.09 LBS

## 2019-12-17 LAB
ALBUMIN SERPL ELPH-MCNC: 4 G/DL — SIGNIFICANT CHANGE UP (ref 3.5–5)
ALP SERPL-CCNC: 38 U/L — LOW (ref 40–120)
ALT FLD-CCNC: 17 U/L DA — SIGNIFICANT CHANGE UP (ref 10–60)
ANION GAP SERPL CALC-SCNC: 6 MMOL/L — SIGNIFICANT CHANGE UP (ref 5–17)
AST SERPL-CCNC: 12 U/L — SIGNIFICANT CHANGE UP (ref 10–40)
BASOPHILS # BLD AUTO: 0.01 K/UL — SIGNIFICANT CHANGE UP (ref 0–0.2)
BASOPHILS NFR BLD AUTO: 0.1 % — SIGNIFICANT CHANGE UP (ref 0–2)
BILIRUB SERPL-MCNC: 0.4 MG/DL — SIGNIFICANT CHANGE UP (ref 0.2–1.2)
BUN SERPL-MCNC: 26 MG/DL — HIGH (ref 7–18)
CALCIUM SERPL-MCNC: 9.4 MG/DL — SIGNIFICANT CHANGE UP (ref 8.4–10.5)
CHLORIDE SERPL-SCNC: 101 MMOL/L — SIGNIFICANT CHANGE UP (ref 96–108)
CO2 SERPL-SCNC: 28 MMOL/L — SIGNIFICANT CHANGE UP (ref 22–31)
CREAT SERPL-MCNC: 0.92 MG/DL — SIGNIFICANT CHANGE UP (ref 0.5–1.3)
EOSINOPHIL # BLD AUTO: 0 K/UL — SIGNIFICANT CHANGE UP (ref 0–0.5)
EOSINOPHIL NFR BLD AUTO: 0 % — SIGNIFICANT CHANGE UP (ref 0–6)
GLUCOSE SERPL-MCNC: 126 MG/DL — HIGH (ref 70–99)
HCT VFR BLD CALC: 40 % — SIGNIFICANT CHANGE UP (ref 39–50)
HGB BLD-MCNC: 13.6 G/DL — SIGNIFICANT CHANGE UP (ref 13–17)
IMM GRANULOCYTES NFR BLD AUTO: 0.1 % — SIGNIFICANT CHANGE UP (ref 0–1.5)
LACTATE SERPL-SCNC: 1.5 MMOL/L — SIGNIFICANT CHANGE UP (ref 0.7–2)
LYMPHOCYTES # BLD AUTO: 0.89 K/UL — LOW (ref 1–3.3)
LYMPHOCYTES # BLD AUTO: 12 % — LOW (ref 13–44)
MCHC RBC-ENTMCNC: 33.5 PG — SIGNIFICANT CHANGE UP (ref 27–34)
MCHC RBC-ENTMCNC: 34 GM/DL — SIGNIFICANT CHANGE UP (ref 32–36)
MCV RBC AUTO: 98.5 FL — SIGNIFICANT CHANGE UP (ref 80–100)
MONOCYTES # BLD AUTO: 0.32 K/UL — SIGNIFICANT CHANGE UP (ref 0–0.9)
MONOCYTES NFR BLD AUTO: 4.3 % — SIGNIFICANT CHANGE UP (ref 2–14)
NEUTROPHILS # BLD AUTO: 6.19 K/UL — SIGNIFICANT CHANGE UP (ref 1.8–7.4)
NEUTROPHILS NFR BLD AUTO: 83.5 % — HIGH (ref 43–77)
NRBC # BLD: 0 /100 WBCS — SIGNIFICANT CHANGE UP (ref 0–0)
PLATELET # BLD AUTO: 212 K/UL — SIGNIFICANT CHANGE UP (ref 150–400)
POTASSIUM SERPL-MCNC: 4.1 MMOL/L — SIGNIFICANT CHANGE UP (ref 3.5–5.3)
POTASSIUM SERPL-SCNC: 4.1 MMOL/L — SIGNIFICANT CHANGE UP (ref 3.5–5.3)
PROT SERPL-MCNC: 8 G/DL — SIGNIFICANT CHANGE UP (ref 6–8.3)
RBC # BLD: 4.06 M/UL — LOW (ref 4.2–5.8)
RBC # FLD: 12.1 % — SIGNIFICANT CHANGE UP (ref 10.3–14.5)
SODIUM SERPL-SCNC: 135 MMOL/L — SIGNIFICANT CHANGE UP (ref 135–145)
WBC # BLD: 7.42 K/UL — SIGNIFICANT CHANGE UP (ref 3.8–10.5)
WBC # FLD AUTO: 7.42 K/UL — SIGNIFICANT CHANGE UP (ref 3.8–10.5)

## 2019-12-17 PROCEDURE — 74177 CT ABD & PELVIS W/CONTRAST: CPT | Mod: 26

## 2019-12-17 PROCEDURE — 71045 X-RAY EXAM CHEST 1 VIEW: CPT | Mod: 26

## 2019-12-17 RX ORDER — SODIUM CHLORIDE 9 MG/ML
1000 INJECTION INTRAMUSCULAR; INTRAVENOUS; SUBCUTANEOUS ONCE
Refills: 0 | Status: COMPLETED | OUTPATIENT
Start: 2019-12-17 | End: 2019-12-17

## 2019-12-17 RX ADMIN — SODIUM CHLORIDE 1000 MILLILITER(S): 9 INJECTION INTRAMUSCULAR; INTRAVENOUS; SUBCUTANEOUS at 23:39

## 2019-12-17 RX ADMIN — Medication 30 MILLILITER(S): at 21:23

## 2019-12-17 NOTE — ED ADULT NURSE NOTE - NSIMPLEMENTINTERV_GEN_ALL_ED
Implemented All Universal Safety Interventions:  [place] to call system. Call bell, personal items and telephone within reach. Instruct patient to call for assistance. Room bathroom lighting operational. Non-slip footwear when patient is off stretcher. Physically safe environment: no spills, clutter or unnecessary equipment. Stretcher in lowest position, wheels locked, appropriate side rails in place.

## 2019-12-17 NOTE — ED ADULT NURSE NOTE - OBJECTIVE STATEMENT
Pt came in ambulatory reporting abd pain X1day with 1 episode of vomiting. AxO3. HX HTN, HLD. Allergies to Losartan. Pt denied any diarrhea, SOB, chest pain,  dizziness. All safety precautions in place.

## 2019-12-17 NOTE — ED PROVIDER NOTE - CLINICAL SUMMARY MEDICAL DECISION MAKING FREE TEXT BOX
79 yr old male with hx of HTn and diverticulosis presents to ed c/o lower mid abd pain since 5am.  mild nausea, no fever, normal BM, no dysuria.    abd pain possibly mild colitis vs bowel gas irritation if labs and pain returns or worsen then ct to r/o uncomplicated diverticulitis.  pt has a very benign abd and history started 5 am today

## 2019-12-17 NOTE — ED PROVIDER NOTE - PROGRESS NOTE DETAILS
Garrett: still in pain. NGT placed and minimal outpt.  rpt xr shows coiling at stomach. surgery eval    admit to surgery for SBO

## 2019-12-18 DIAGNOSIS — K56.609 UNSPECIFIED INTESTINAL OBSTRUCTION, UNSPECIFIED AS TO PARTIAL VERSUS COMPLETE OBSTRUCTION: ICD-10-CM

## 2019-12-18 PROCEDURE — 99222 1ST HOSP IP/OBS MODERATE 55: CPT

## 2019-12-18 PROCEDURE — 74250 X-RAY XM SM INT 1CNTRST STD: CPT | Mod: 26

## 2019-12-18 PROCEDURE — 99284 EMERGENCY DEPT VISIT MOD MDM: CPT

## 2019-12-18 RX ORDER — ACETAMINOPHEN 500 MG
1000 TABLET ORAL ONCE
Refills: 0 | Status: DISCONTINUED | OUTPATIENT
Start: 2019-12-18 | End: 2019-12-20

## 2019-12-18 RX ORDER — FAMOTIDINE 10 MG/ML
20 INJECTION INTRAVENOUS
Refills: 0 | Status: DISCONTINUED | OUTPATIENT
Start: 2019-12-18 | End: 2019-12-20

## 2019-12-18 RX ORDER — HEPARIN SODIUM 5000 [USP'U]/ML
5000 INJECTION INTRAVENOUS; SUBCUTANEOUS EVERY 8 HOURS
Refills: 0 | Status: DISCONTINUED | OUTPATIENT
Start: 2019-12-18 | End: 2019-12-20

## 2019-12-18 RX ORDER — DEXTROSE MONOHYDRATE, SODIUM CHLORIDE, AND POTASSIUM CHLORIDE 50; .745; 4.5 G/1000ML; G/1000ML; G/1000ML
1000 INJECTION, SOLUTION INTRAVENOUS
Refills: 0 | Status: DISCONTINUED | OUTPATIENT
Start: 2019-12-18 | End: 2019-12-20

## 2019-12-18 RX ORDER — ONDANSETRON 8 MG/1
4 TABLET, FILM COATED ORAL EVERY 6 HOURS
Refills: 0 | Status: DISCONTINUED | OUTPATIENT
Start: 2019-12-18 | End: 2019-12-20

## 2019-12-18 RX ADMIN — FAMOTIDINE 20 MILLIGRAM(S): 10 INJECTION INTRAVENOUS at 17:29

## 2019-12-18 RX ADMIN — HEPARIN SODIUM 5000 UNIT(S): 5000 INJECTION INTRAVENOUS; SUBCUTANEOUS at 05:49

## 2019-12-18 RX ADMIN — HEPARIN SODIUM 5000 UNIT(S): 5000 INJECTION INTRAVENOUS; SUBCUTANEOUS at 21:25

## 2019-12-18 RX ADMIN — FAMOTIDINE 20 MILLIGRAM(S): 10 INJECTION INTRAVENOUS at 05:48

## 2019-12-18 RX ADMIN — DEXTROSE MONOHYDRATE, SODIUM CHLORIDE, AND POTASSIUM CHLORIDE 125 MILLILITER(S): 50; .745; 4.5 INJECTION, SOLUTION INTRAVENOUS at 04:00

## 2019-12-18 NOTE — H&P ADULT - ASSESSMENT
80 y/o man with PMH of HTN, GERD, diverticulosis, prostate ca, virgin abdomen with Small bowel obstruction

## 2019-12-18 NOTE — H&P ADULT - NSHPLABSRESULTS_GEN_ALL_CORE
13.6   7.42  )-----------( 212      ( 17 Dec 2019 21:37 )             40.0     12-17    135  |  101  |  26<H>  ----------------------------<  126<H>  4.1   |  28  |  0.92    Ca    9.4      17 Dec 2019 21:37    TPro  8.0  /  Alb  4.0  /  TBili  0.4  /  DBili  x   /  AST  12  /  ALT  17  /  AlkPhos  38<L>  12-17    Lactate, Blood: 1.5 mmol/L (12.17.19 @ 21:37)    < from: CT Abdomen and Pelvis w/ IV Cont (12.17.19 @ 22:34) >    EXAM:  CT ABDOMEN AND PELVIS IC                            PROCEDURE DATE:  12/17/2019          INTERPRETATION:  CT ABDOMEN AND PELVIS WITH IV CONTRAST    CLINICAL INFORMATION: Generalized abdominal pain      COMPARISON: CT abdomen and pelvis 8/1/2018    PROCEDURE:   CT of the Abdomen and Pelvis was performed with intravenous contrast.  Intravenous contrast: 90 cc Omnipaque 350  Oral contrast: None.  Sagittal and coronal reformats were performed.    FINDINGS:    LOWER CHEST: Clear.    LIVER: Multiple cysts again seen.  BILE DUCTS: Nondilated.  GALLBLADDER: Normal.  SPLEEN: Normal.  PANCREAS: Normal.  ADRENALS: Normal.  KIDNEYS/URETERS: No calculi, hydronephrosis, or soft tissue attenuating   mass. Multiple cysts again seen.    BLADDER: Normal.  REPRODUCTIVE ORGANS: Radiation fiducial markers.    BOWEL: Mid small bowel obstruction with transition to collapsed loops in   the central mesentery. Distal loops are collapsed. Mild mesenteric edema   without pneumatosis. Normal appendix.  PERITONEUM: Trace ascites. No free air.  VESSELS:  Normal caliber aorta.  RETROPERITONEUM/LYMPH NODES: No adenopathy.    ABDOMINAL WALL: Normal.  BONES: No acute bony abnormality.    IMPRESSION:     Mid small bowel obstruction with mesenteric edema and small ascites. No   pneumatosis or free air.        BRITTANY DAWSON M.D., ATTENDING RADIOLOGIST  This document has been electronically signed. Dec 17 2019 10:58PM    < end of copied text >

## 2019-12-18 NOTE — H&P ADULT - HISTORY OF PRESENT ILLNESS
80 y/o man with PMH of HTN, diverticulosis, GERD, prostate ca, c/o abdominal pain since early morning. Pain constant, located in periumbilical area. Pt had 1-2 episodes of vomiting. Last BM in afternoon. No flatus now. Pt had his regular meal consisting of chicken, salad etc last night.   Pt denies fever/chills, CP, SOB, dizziness, hematemesis, hematochezia, or hx of similar pain in past.  Pt denies having any abdominal surgery in past.  Last colonoscopy was after admission for diverticular bleeding 2 years ago.  CBC WNL  Lactate 1.5  CT abd/pel: Small bowel obstruction  NGT was placed in ED; initially drained 400 cc

## 2019-12-18 NOTE — H&P ADULT - NSHPPHYSICALEXAM_GEN_ALL_CORE
Vital Signs Last 24 Hrs  T(C): 36.6 (17 Dec 2019 18:12), Max: 36.6 (17 Dec 2019 18:12)  T(F): 97.8 (17 Dec 2019 18:12), Max: 97.8 (17 Dec 2019 18:12)  HR: 83 (17 Dec 2019 18:12) (83 - 83)  BP: 170/83 (17 Dec 2019 18:12) (170/83 - 170/83)  BP(mean): --  RR: 16 (17 Dec 2019 18:12) (16 - 16)  SpO2: 98% (17 Dec 2019 18:12) (98% - 98%)

## 2019-12-18 NOTE — H&P ADULT - NSICDXPASTMEDICALHX_GEN_ALL_CORE_FT
PAST MEDICAL HISTORY:  Diverticulosis     H/O gastroesophageal reflux (GERD)     HTN (hypertension)     Prostate cancer     Skipped beats

## 2019-12-18 NOTE — PROGRESS NOTE ADULT - ASSESSMENT
80yo male with sbo    1) cont ngt to lws  2) oob ambulate  3) small bowel series this AM  4) cont npo, ivf

## 2019-12-18 NOTE — H&P ADULT - NSHPSOCIALHISTORY_GEN_ALL_CORE
Single  Lives alone  Retired Dialysis technician  Smoked 2 packs/day for 20 years  Quit smoking 50 years ago  Denies EtOH/drugs  Homosexual

## 2019-12-18 NOTE — PROGRESS NOTE ADULT - SUBJECTIVE AND OBJECTIVE BOX
Patient examined at bedside, admitted overnight with nayeli  No nausea, no vomiting  NGT in place to lws 250ml output  denies flatus or BM    T(C): 37.7 (12-18-19 @ 05:41), Max: 37.7 (12-18-19 @ 05:41)  HR: 75 (12-18-19 @ 05:41) (70 - 83)  BP: 134/69 (12-18-19 @ 05:41) (134/69 - 170/83)  RR: 18 (12-18-19 @ 05:41) (16 - 18)  SpO2: 98% (12-18-19 @ 05:41) (98% - 100%)  Wt(kg): --      12-17 @ 07:01  -  12-18 @ 07:00  --------------------------------------------------------  IN: 0 mL / OUT: 550 mL / NET: -550 mL      Physical Exam  General: AAOx3, No acute distress  Skin: No jaundice, no icterus  Abdomen: soft, nontender, nondistended, no rebound tenderness, no guarding, no palpable masses  Extremities: non edematous, no calf pain bilaterally                          13.6   7.42  )-----------( 212      ( 17 Dec 2019 21:37 )             40.0   12-17    135  |  101  |  26<H>  ----------------------------<  126<H>  4.1   |  28  |  0.92    Ca    9.4      17 Dec 2019 21:37    TPro  8.0  /  Alb  4.0  /  TBili  0.4  /  DBili  x   /  AST  12  /  ALT  17  /  AlkPhos  38<L>  12-17

## 2019-12-19 LAB
ANION GAP SERPL CALC-SCNC: 3 MMOL/L — LOW (ref 5–17)
BUN SERPL-MCNC: 18 MG/DL — SIGNIFICANT CHANGE UP (ref 7–18)
CALCIUM SERPL-MCNC: 8.8 MG/DL — SIGNIFICANT CHANGE UP (ref 8.4–10.5)
CHLORIDE SERPL-SCNC: 107 MMOL/L — SIGNIFICANT CHANGE UP (ref 96–108)
CO2 SERPL-SCNC: 32 MMOL/L — HIGH (ref 22–31)
CREAT SERPL-MCNC: 1.05 MG/DL — SIGNIFICANT CHANGE UP (ref 0.5–1.3)
GLUCOSE SERPL-MCNC: 122 MG/DL — HIGH (ref 70–99)
HCT VFR BLD CALC: 38.9 % — LOW (ref 39–50)
HGB BLD-MCNC: 12.8 G/DL — LOW (ref 13–17)
MCHC RBC-ENTMCNC: 32.9 GM/DL — SIGNIFICANT CHANGE UP (ref 32–36)
MCHC RBC-ENTMCNC: 32.9 PG — SIGNIFICANT CHANGE UP (ref 27–34)
MCV RBC AUTO: 100 FL — SIGNIFICANT CHANGE UP (ref 80–100)
NRBC # BLD: 0 /100 WBCS — SIGNIFICANT CHANGE UP (ref 0–0)
PLATELET # BLD AUTO: 201 K/UL — SIGNIFICANT CHANGE UP (ref 150–400)
POTASSIUM SERPL-MCNC: 4.6 MMOL/L — SIGNIFICANT CHANGE UP (ref 3.5–5.3)
POTASSIUM SERPL-SCNC: 4.6 MMOL/L — SIGNIFICANT CHANGE UP (ref 3.5–5.3)
RBC # BLD: 3.89 M/UL — LOW (ref 4.2–5.8)
RBC # FLD: 12.4 % — SIGNIFICANT CHANGE UP (ref 10.3–14.5)
SODIUM SERPL-SCNC: 142 MMOL/L — SIGNIFICANT CHANGE UP (ref 135–145)
WBC # BLD: 7.46 K/UL — SIGNIFICANT CHANGE UP (ref 3.8–10.5)
WBC # FLD AUTO: 7.46 K/UL — SIGNIFICANT CHANGE UP (ref 3.8–10.5)

## 2019-12-19 PROCEDURE — 99232 SBSQ HOSP IP/OBS MODERATE 35: CPT

## 2019-12-19 RX ORDER — DILTIAZEM HCL 120 MG
120 CAPSULE, EXT RELEASE 24 HR ORAL DAILY
Refills: 0 | Status: DISCONTINUED | OUTPATIENT
Start: 2019-12-19 | End: 2019-12-20

## 2019-12-19 RX ADMIN — HEPARIN SODIUM 5000 UNIT(S): 5000 INJECTION INTRAVENOUS; SUBCUTANEOUS at 21:24

## 2019-12-19 RX ADMIN — FAMOTIDINE 20 MILLIGRAM(S): 10 INJECTION INTRAVENOUS at 05:40

## 2019-12-19 RX ADMIN — FAMOTIDINE 20 MILLIGRAM(S): 10 INJECTION INTRAVENOUS at 17:22

## 2019-12-19 RX ADMIN — DEXTROSE MONOHYDRATE, SODIUM CHLORIDE, AND POTASSIUM CHLORIDE 125 MILLILITER(S): 50; .745; 4.5 INJECTION, SOLUTION INTRAVENOUS at 00:00

## 2019-12-19 RX ADMIN — HEPARIN SODIUM 5000 UNIT(S): 5000 INJECTION INTRAVENOUS; SUBCUTANEOUS at 13:00

## 2019-12-19 RX ADMIN — HEPARIN SODIUM 5000 UNIT(S): 5000 INJECTION INTRAVENOUS; SUBCUTANEOUS at 05:40

## 2019-12-19 RX ADMIN — Medication 120 MILLIGRAM(S): at 23:20

## 2019-12-19 NOTE — PROGRESS NOTE ADULT - ASSESSMENT
79y.o. Male with resolving SBO without surgical history    -NGT fell out. Will observe patient. Will need replacement if pt becomes more distended and/or have increase N/V  -OOB ambulate  -Keep NPO  -IVF

## 2019-12-19 NOTE — PROGRESS NOTE ADULT - SUBJECTIVE AND OBJECTIVE BOX
INTERVAL HPI/OVERNIGHT EVENTS:  Pt resting comfortably. No acute complaints.  NGT to LWS  +flatus & diarrhea    MEDICATIONS  (STANDING):  acetaminophen  IVPB .. 1000 milliGRAM(s) IV Intermittent once  dextrose 5% + sodium chloride 0.45% with potassium chloride 10 mEq/L 1000 milliLiter(s) (125 mL/Hr) IV Continuous <Continuous>  famotidine Injectable 20 milliGRAM(s) IV Push two times a day  heparin  Injectable 5000 Unit(s) SubCutaneous every 8 hours    MEDICATIONS  (PRN):  ondansetron Injectable 4 milliGRAM(s) IV Push every 6 hours PRN Nausea and/or Vomiting    Vital Signs Last 24 Hrs  T(C): 37.1 (19 Dec 2019 05:48), Max: 37.2 (18 Dec 2019 21:20)  T(F): 98.7 (19 Dec 2019 05:48), Max: 98.9 (18 Dec 2019 21:20)  HR: 88 (19 Dec 2019 05:48) (75 - 88)  BP: 137/66 (19 Dec 2019 05:48) (137/66 - 145/75)  BP(mean): --  RR: 16 (19 Dec 2019 05:48) (16 - 16)  SpO2: 98% (19 Dec 2019 05:48) (98% - 100%)    Physical:  General: A&Ox3. NAD.  Abdomen: Soft mildly distended, nontender.     LABS:                        12.8   7.46  )-----------( 201      ( 19 Dec 2019 07:11 )             38.9             12-19    142  |  107  |  18  ----------------------------<  122<H>  4.6   |  32<H>  |  1.05    Ca    8.8      19 Dec 2019 07:11    TPro  8.0  /  Alb  4.0  /  TBili  0.4  /  DBili  x   /  AST  12  /  ALT  17  /  AlkPhos  38<L>  12-17    I&O's Detail    18 Dec 2019 07:01  -  19 Dec 2019 07:00  --------------------------------------------------------  IN:  Total IN: 0 mL    OUT:    Nasoenteral Tube: 1050 mL bilious  Total OUT: 1050 mL    Total NET: -1050 mL    < from: Xray Small Bowel Series (12.18.19 @ 17:29) >  IMPRESSION:    No complete bowel obstruction as above.    Mild focal dilatation of a mid jejunal loops secondary to ileus or low-grade partial obstruction.    < end of copied text >

## 2019-12-20 ENCOUNTER — TRANSCRIPTION ENCOUNTER (OUTPATIENT)
Age: 79
End: 2019-12-20

## 2019-12-20 VITALS
SYSTOLIC BLOOD PRESSURE: 141 MMHG | DIASTOLIC BLOOD PRESSURE: 76 MMHG | TEMPERATURE: 98 F | HEART RATE: 101 BPM | RESPIRATION RATE: 16 BRPM | OXYGEN SATURATION: 100 %

## 2019-12-20 PROCEDURE — 71045 X-RAY EXAM CHEST 1 VIEW: CPT

## 2019-12-20 PROCEDURE — 36415 COLL VENOUS BLD VENIPUNCTURE: CPT

## 2019-12-20 PROCEDURE — 99238 HOSP IP/OBS DSCHRG MGMT 30/<: CPT

## 2019-12-20 PROCEDURE — 80048 BASIC METABOLIC PNL TOTAL CA: CPT

## 2019-12-20 PROCEDURE — 74250 X-RAY XM SM INT 1CNTRST STD: CPT

## 2019-12-20 PROCEDURE — 99285 EMERGENCY DEPT VISIT HI MDM: CPT | Mod: 25

## 2019-12-20 PROCEDURE — 83605 ASSAY OF LACTIC ACID: CPT

## 2019-12-20 PROCEDURE — 74177 CT ABD & PELVIS W/CONTRAST: CPT

## 2019-12-20 PROCEDURE — 80053 COMPREHEN METABOLIC PANEL: CPT

## 2019-12-20 PROCEDURE — 85027 COMPLETE CBC AUTOMATED: CPT

## 2019-12-20 RX ORDER — PANTOPRAZOLE SODIUM 20 MG/1
40 TABLET, DELAYED RELEASE ORAL
Refills: 0 | Status: DISCONTINUED | OUTPATIENT
Start: 2019-12-20 | End: 2019-12-20

## 2019-12-20 RX ADMIN — Medication 10 MILLIGRAM(S): at 05:40

## 2019-12-20 RX ADMIN — FAMOTIDINE 20 MILLIGRAM(S): 10 INJECTION INTRAVENOUS at 05:40

## 2019-12-20 RX ADMIN — DEXTROSE MONOHYDRATE, SODIUM CHLORIDE, AND POTASSIUM CHLORIDE 125 MILLILITER(S): 50; .745; 4.5 INJECTION, SOLUTION INTRAVENOUS at 05:40

## 2019-12-20 RX ADMIN — HEPARIN SODIUM 5000 UNIT(S): 5000 INJECTION INTRAVENOUS; SUBCUTANEOUS at 05:40

## 2019-12-20 NOTE — DISCHARGE NOTE PROVIDER - NSDCCPCAREPLAN_GEN_ALL_CORE_FT
PRINCIPAL DISCHARGE DIAGNOSIS  Diagnosis: SBO (small bowel obstruction)  Assessment and Plan of Treatment: Drink plenty of fluids. Call for any fever over 101, nausea, vomiting, severe pain, no passing of gas or bowel movement.

## 2019-12-20 NOTE — DISCHARGE NOTE PROVIDER - NSDCMRMEDTOKEN_GEN_ALL_CORE_FT
dilTIAZem 120 mg/24 hours oral capsule, extended release: 1 cap(s) orally once a day  docusate sodium 100 mg oral capsule: 1 cap(s) orally 2 times a day  enalapril 10 mg oral tablet: 1 tab(s) orally once a day  pantoprazole 40 mg oral delayed release tablet: 1 tab(s) orally once a day (before a meal)  polyethylene glycol 3350 oral powder for reconstitution: 17 gram(s) orally once a day

## 2019-12-20 NOTE — DISCHARGE NOTE PROVIDER - CARE PROVIDER_API CALL
Victor M Enriquez (MD)  Surgery  [address] [place] [address] [id number]  Phone: [phone]  Fax: [phone]  Follow Up Time: 2 weeks

## 2019-12-20 NOTE — PROGRESS NOTE ADULT - SUBJECTIVE AND OBJECTIVE BOX
Patient seen and examined at bedside with no complaints.   Admits to flatus/BM.   Denies pain, nausea/ vomiting.   Tolerating clear liquid diet.    Vital Signs Last 24 Hrs  T(F): 98.6 (12-20-19 @ 05:47), Max: 98.8 (12-19-19 @ 21:46)  HR: 90 (12-20-19 @ 05:47)  BP: 144/76 (12-20-19 @ 05:47)  RR: 16 (12-20-19 @ 05:47)  SpO2: 99% (12-20-19 @ 05:47)    GENERAL: Alert, NAD  CHEST/LUNG: respirations nonlabored  ABDOMEN: soft, Nontender, Nondistended  EXTREMITIES:  no calf tenderness, No edema    I&O's Detail    LABS:                        12.8   7.46  )-----------( 201      ( 19 Dec 2019 07:11 )             38.9     12-19    142  |  107  |  18  ----------------------------<  122<H>  4.6   |  32<H>  |  1.05    Ca    8.8      19 Dec 2019 07:11

## 2019-12-20 NOTE — DISCHARGE NOTE PROVIDER - NSDCFUADDINST_GEN_ALL_CORE_FT
Please follow up with Dr. Enriquez as an outpatient. You may need further imaging including a CT enterography

## 2019-12-20 NOTE — DISCHARGE NOTE NURSING/CASE MANAGEMENT/SOCIAL WORK - PATIENT PORTAL LINK FT
You can access the FollowMyHealth Patient Portal offered by [facility] by registering at the following website: [email or web address]. By joining [facility]’s FollowMyHealth portal, you will also be able to view your health information using other applications (apps) compatible with our system.

## 2019-12-20 NOTE — DISCHARGE NOTE PROVIDER - HOSPITAL COURSE
80 y/o man with PMH of HTN, diverticulosis, GERD, prostate ca, presented c/o abdominal pain since early morning. Pain constant, located in periumbilical area. Pt had 1-2 episodes of vomiting. Last BM in afternoon. No flatus now. Pt denies having any abdominal surgery in past. He was found to have small bowel obstruction on CT scan and had NGT placed which drained 400cc initially. He was admitted and managed conservatively. He regained bowel function approximately 24huors after admission. Small bowel series was done which revealed a possible ileus or partial obstruction due to dilation of jejunal loops. His diet was advanced as tolerated and he was clered for discharge when he was toelrating a regular diet.

## 2019-12-23 PROBLEM — C61 MALIGNANT NEOPLASM OF PROSTATE: Chronic | Status: ACTIVE | Noted: 2019-12-18

## 2019-12-23 PROBLEM — Z87.19 PERSONAL HISTORY OF OTHER DISEASES OF THE DIGESTIVE SYSTEM: Chronic | Status: ACTIVE | Noted: 2019-12-18

## 2020-01-02 ENCOUNTER — APPOINTMENT (OUTPATIENT)
Dept: SURGERY | Facility: CLINIC | Age: 80
End: 2020-01-02
Payer: MEDICARE

## 2020-01-02 VITALS
SYSTOLIC BLOOD PRESSURE: 149 MMHG | TEMPERATURE: 98.4 F | DIASTOLIC BLOOD PRESSURE: 68 MMHG | OXYGEN SATURATION: 99 % | HEIGHT: 70 IN | HEART RATE: 108 BPM | WEIGHT: 182 LBS | BODY MASS INDEX: 26.05 KG/M2

## 2020-01-02 DIAGNOSIS — K56.609 UNSPECIFIED INTESTINAL OBSTRUCTION, UNSPECIFIED AS TO PARTIAL VERSUS COMPLETE OBSTRUCTION: ICD-10-CM

## 2020-01-02 DIAGNOSIS — Z83.3 FAMILY HISTORY OF DIABETES MELLITUS: ICD-10-CM

## 2020-01-02 PROCEDURE — 99213 OFFICE O/P EST LOW 20 MIN: CPT

## 2020-01-02 NOTE — HISTORY OF PRESENT ILLNESS
[de-identified] : Patient is a 79 y.o M with PMHX HTN, diverticulosis,GERD, prostate CA, admitted to [facility], 12/18-12/20/19. Patient was found to have SBO on CT scan and had NGT placed which drained 400 cc; he was managed conservatively; small bowel series was done which revealed possible ileus or partial obstruction due to dilation of jejunal lops.\par Patient presents to the office today for follow up visit. Patient states he has been feeling much better, denies pain, c/o having a lot of belching. He also admits to acid reflux, which he has had for years. Denies any previous surgical hx.

## 2020-01-02 NOTE — CONSULT LETTER
[Dear  ___] : Dear  [unfilled], [Consult Letter:] : I had the pleasure of evaluating your patient, [unfilled]. [Consult Closing:] : Thank you very much for allowing me to participate in the care of this patient.  If you have any questions, please do not hesitate to contact me. [Sincerely,] : Sincerely, [FreeTextEntry3] : Victor M Enriquez MD\par

## 2020-01-02 NOTE — PLAN
[FreeTextEntry1] : Ordered CT Enterography; \par Patient to follow up in the office after the test results \par \par Also recommended follow up with GI, patient states he has seen Dr. Mcwilliams in the past

## 2020-01-02 NOTE — PHYSICAL EXAM
[Normal Breath Sounds] : Normal breath sounds [Normal Rate and Rhythm] : normal rate and rhythm [No Rash or Lesion] : No rash or lesion [Alert] : alert [Oriented to Person] : oriented to person [Oriented to Place] : oriented to place [Calm] : calm [Oriented to Time] : oriented to time [JVD] : no jugular venous distention  [de-identified] : A/Ox3; NAD. appears comfortable [de-identified] : EOMI [de-identified] : Abd is soft, nondistended, no rebound or guarding. No abdominal masses. No abdominal tenderness. [de-identified] : +ROM, no joint swelling

## 2020-01-02 NOTE — REVIEW OF SYSTEMS
[Fever] : no fever [Chills] : no chills [Chest Pain] : no chest pain [Lower Ext Edema] : no extremity edema [Shortness Of Breath] : no shortness of breath [Cough] : no cough [Abdominal Pain] : no abdominal pain [Vomiting] : no vomiting [Hesitancy] : no urinary hesitancy [Joint Swelling] : no joint swelling [Skin Lesions] : no skin lesions [Dizziness] : no dizziness [Anxiety] : no anxiety [Muscle Weakness] : no muscle weakness [Swollen Glands] : no swollen glands [FreeTextEntry7] : recently admitted for SBO

## 2020-01-15 ENCOUNTER — FORM ENCOUNTER (OUTPATIENT)
Age: 80
End: 2020-01-15

## 2020-01-16 ENCOUNTER — APPOINTMENT (OUTPATIENT)
Dept: CT IMAGING | Facility: HOSPITAL | Age: 80
End: 2020-01-16
Payer: MEDICARE

## 2020-01-16 ENCOUNTER — OUTPATIENT (OUTPATIENT)
Dept: OUTPATIENT SERVICES | Facility: HOSPITAL | Age: 80
LOS: 1 days | End: 2020-01-16
Payer: MEDICARE

## 2020-01-16 DIAGNOSIS — K56.609 UNSPECIFIED INTESTINAL OBSTRUCTION, UNSPECIFIED AS TO PARTIAL VERSUS COMPLETE OBSTRUCTION: ICD-10-CM

## 2020-01-16 PROCEDURE — 74177 CT ABD & PELVIS W/CONTRAST: CPT

## 2020-01-16 PROCEDURE — 74177 CT ABD & PELVIS W/CONTRAST: CPT | Mod: 26

## 2020-01-27 ENCOUNTER — APPOINTMENT (OUTPATIENT)
Dept: SURGERY | Facility: CLINIC | Age: 80
End: 2020-01-27
Payer: MEDICARE

## 2020-01-27 VITALS
HEIGHT: 70 IN | SYSTOLIC BLOOD PRESSURE: 151 MMHG | WEIGHT: 184 LBS | HEART RATE: 107 BPM | OXYGEN SATURATION: 99 % | BODY MASS INDEX: 26.34 KG/M2 | DIASTOLIC BLOOD PRESSURE: 70 MMHG | TEMPERATURE: 98.4 F

## 2020-01-27 PROCEDURE — 99213 OFFICE O/P EST LOW 20 MIN: CPT

## 2020-01-27 NOTE — PHYSICAL EXAM
[Normal Breath Sounds] : Normal breath sounds [Normal Rate and Rhythm] : normal rate and rhythm [No Rash or Lesion] : No rash or lesion [Alert] : alert [Oriented to Person] : oriented to person [Oriented to Place] : oriented to place [Oriented to Time] : oriented to time [Calm] : calm [JVD] : no jugular venous distention  [de-identified] : A/Ox3; NAD. appears comfortable [de-identified] : EOMI [de-identified] : Abd is soft, nondistended, no rebound or guarding. No abdominal masses. No abdominal tenderness. [de-identified] : +ROM, no joint swelling

## 2020-01-27 NOTE — HISTORY OF PRESENT ILLNESS
[de-identified] : Patient is a 79 y.o M with PMHX HTN, diverticulosis,GERD, prostate CA, admitted to [facility], 12/18-12/20/19. Patient was found to have SBO on CT scan and had NGT placed which drained 400 cc; he was managed conservatively; small bowel series was done which revealed possible ileus or partial obstruction due to dilation of jejunal lops.\par He admits to acid reflux, which he has had for years. Denies any previous surgical hx. \par CT enterography had been ordered, patient had the test done 01/16/20; results showed that SBO has resolved, Nonspecific 4 mm left lower lobe lung nodule; Multiple indeterminate hypodense lesions in the kidneys bilaterally. \par Patient states he has been continuing to experience belching; otherwise, without reported complaints.

## 2020-01-27 NOTE — REVIEW OF SYSTEMS
[Fever] : no fever [Chills] : no chills [Chest Pain] : no chest pain [Lower Ext Edema] : no extremity edema [Shortness Of Breath] : no shortness of breath [Cough] : no cough [Abdominal Pain] : no abdominal pain [Hesitancy] : no urinary hesitancy [Joint Swelling] : no joint swelling [Joint Stiffness] : no joint stiffness [Skin Lesions] : no skin lesions [Skin Wound] : no skin wound [Confused] : no confusion [Dizziness] : no dizziness [Anxiety] : no anxiety [Muscle Weakness] : no muscle weakness [Swollen Glands] : no swollen glands

## 2020-01-27 NOTE — ASSESSMENT
[FreeTextEntry1] : Patient is a 79 y.o M with PMHX HTN, diverticulosis,GERD, prostate CA, admitted to [facility], 12/18-12/20/19. Patient was found to have SBO on CT scan and had NGT placed which drained 400 cc; he was managed conservatively; small bowel series was done which revealed possible ileus or partial obstruction due to dilation of jejunal lops.\par CT enterography had been ordered, patient had the test done 01/16/20; results showed that SBO has resolved, Nonspecific 4 mm left lower lobe lung nodule; Multiple indeterminate hypodense lesions in the kidneys bilaterally. \par Patient states he has been continuing to experience belching; otherwise, without reported complaints.

## 2020-01-27 NOTE — DATA REVIEWED
[FreeTextEntry1] :  EXAM:  CT ENTEROGRAPHY OC IC                      \par \par PROCEDURE DATE:  01/16/2020  \par INTERPRETATION:  CT enterography with IV contrast\par \par Indication: Small bowel obstruction on prior CT dated 12/17/2019.\par \par Attending: Axial multidetector CT images of the abdomen and pelvis are acquired following the administration of oral contrast (VoLumen) and IV contrast (90 cc Omnipaque-350 administered, 10 cc discarded) according to our CT enterography protocol.\par \par Comparison: 12/17/2019.\par \par Findings: Limited sections through the lung bases demonstrate small bilateral atelectasis. There is a nonspecific 4 mm left lower lobe lung nodule (image 2 series 4); if the patient is in the high risk category (i.e. smoker), follow-up chest CT may be pursued in 12 months to ensure stability.\par \par Multiple hepatic cysts are seen measuring up to 3.0 cm in the left hepatic lobe. There are other small hypodense lesions in the liver, too small to characterize. The gallbladder, common bile duct, pancreas, spleen, and adrenals appear unremarkable. There are bilateral renal cysts measuring up to 4.0 cm on the right. There are other indeterminate hypodense lesions in the kidneys bilaterally; abdominal MR without and with IV contrast is recommended to rule out solid malignant neoplasms. No evidence for a ureteral calculus. No hydronephrosis. Extrarenal pelvises bilaterally.\par \par The appendix appears normal. Colonic diverticulosis without evidence for diverticulitis. No bowel obstruction or grossly thickened bowel wall. Previously noted small bowel obstruction appears to have resolved. There is a small metallic structure in the lumen of the descending colon and it may represent a hemostatic clip.\par \par No evidence for free air, ascites, or enlarged lymph node.\par \par The urinary bladder appears unremarkable. The prostate is enlarged.\par \par Impression: The appendix appears normal. Colonic diverticulosis without evidence for diverticulitis. No bowel obstruction or grossly thickened bowel wall. Previously noted small bowel obstruction appears to have resolved. There is a small metallic structure in the lumen of the descending colon and it may represent a hemostatic clip.\par \par Nonspecific 4 mm left lower lobe lung nodule; if the patient is in the high risk category (i.e. smoker), follow-up chest CT may be pursued in 12 months to ensure stability.\par \par Multiple indeterminate hypodense lesions in the kidneys bilaterally; abdominal MR without and with IV contrast is recommended to rule out solid malignant neoplasms.\par

## 2020-01-27 NOTE — PLAN
[FreeTextEntry1] : imaging results discussed w the patient /copy of the report given to the patient \par SBO has resolved \par referred to follow up with PCP, Dr. Woodard and urology \par also recommended f/u with GI for ongoing sx of belching \par \par patient will follow up if needed. call with concerns

## 2020-01-29 ENCOUNTER — APPOINTMENT (OUTPATIENT)
Dept: UROLOGY | Facility: CLINIC | Age: 80
End: 2020-01-29
Payer: MEDICARE

## 2020-01-29 VITALS
SYSTOLIC BLOOD PRESSURE: 173 MMHG | WEIGHT: 184 LBS | BODY MASS INDEX: 26.34 KG/M2 | HEIGHT: 70 IN | DIASTOLIC BLOOD PRESSURE: 91 MMHG | HEART RATE: 109 BPM

## 2020-01-29 PROCEDURE — 99214 OFFICE O/P EST MOD 30 MIN: CPT

## 2020-01-29 NOTE — ASSESSMENT
[FreeTextEntry1] : ct reviewed\par charbel schedule for mri to better eval renal lesion \par \par cap \par psa low stable\par voiding well\par not currently sexually active\par

## 2020-01-29 NOTE — HISTORY OF PRESENT ILLNESS
[FreeTextEntry1] : cc f/u prostate ca\par Bertrand 4+3 prostate ca\par \par S/p xrt \par Completed 6/17\par Doing well\par urination improved on tamsulosin but stopped neil to congestion rapaflo still gave orthostatic\par psa 0.21 11/18  0.2 8/19  0.2 1/20\par poor erections worsening last year difficulty maintaining  \par ok results 80 mg sildenafil libido decreased - test low but no current partner\par \par \par recent ct showed britney indetermiante renal lesion

## 2020-02-03 ENCOUNTER — APPOINTMENT (OUTPATIENT)
Dept: GASTROENTEROLOGY | Facility: CLINIC | Age: 80
End: 2020-02-03
Payer: MEDICARE

## 2020-02-03 VITALS
SYSTOLIC BLOOD PRESSURE: 163 MMHG | OXYGEN SATURATION: 99 % | DIASTOLIC BLOOD PRESSURE: 73 MMHG | TEMPERATURE: 97.5 F | WEIGHT: 179 LBS | HEART RATE: 97 BPM | BODY MASS INDEX: 25.62 KG/M2 | HEIGHT: 70 IN

## 2020-02-03 DIAGNOSIS — K21.9 GASTRO-ESOPHAGEAL REFLUX DISEASE W/OUT ESOPHAGITIS: ICD-10-CM

## 2020-02-03 DIAGNOSIS — K57.30 DIVERTICULOSIS OF LARGE INTESTINE W/OUT PERFORATION OR ABSCESS W/OUT BLEEDING: ICD-10-CM

## 2020-02-03 LAB — HEMOCCULT STL QL: NEGATIVE

## 2020-02-03 PROCEDURE — 99204 OFFICE O/P NEW MOD 45 MIN: CPT

## 2020-02-03 NOTE — ASSESSMENT
[FreeTextEntry1] : Dyspepsia: The patient complains of dyspeptic symptoms.  The patient was advised to abide by an anti-gas diet.  The patient was given a pamphlet for anti-gas.  The patient and I reviewed the anti-gas diet at length. The patient is to start on a trial of Phazyme one tablet 3 times a day p.r.n. abdominal pain and gas.\par GERD: The patient was advised to avoid late-night meals and dietary indiscretions.  The patient was advised to avoid fried and fatty foods.  The patient was advised to abide by an anti-GERD diet. The patient was given a pamphlet for anti-GERD.  The patient and I reviewed the anti-GERD diet at length. I recommend a trial of Pantoprazole 40 mg once a day x 3 months for the symptoms.\par Upper Endoscopy: I recommend an upper endoscopy to assess for peptic ulcer disease versus esophagitis.  The patient was told of the risks and benefits of the procedure.  The patient was told of the risks of perforation, emergency surgery, bleeding, infections and missed lesions. The patient agreed and will schedule for procedure. The patient is to be n.p.o. after midnight.  The patient is to return for the procedure. \par The patient was previously hospitalized at the [facility] for partial small bowel obstruction.   The patient had blood work and imaging studies to assess the symptoms.  I reviewed the hospital records, blood work and imaging studies performed in the hospital.  \par Partial Small Bowel Obstruction: The patient was recently hospitalized at the [facility] [place] on December 17, 2019 for abdominal pain secondary to partial small bowel obstruction.   The patient was hospitalized for 3 days for the symptoms. The blood work revealed mild anemia. The CAT scan of the abdomen and pelvis with IV contrast performed revealed a normal appendix, colonic diverticulosis without evidence of diverticulitis and no bowel obstruction or grossly thickened bowel wall.  The previously noted small bowel obstruction appears to have resolved.  The barium small bowel follow through  performed on December 18, 2019 revealed no complete bowel obstruction and mild focal dilatation of a mid jejunal loops secondary to ileus or low-grade partial obstruction.  The patient was followed by surgery, Dr. Enriquez during the hospitalization.  The patient was treated conservatively with NG tube with resolution of the bowel obstruction.  The patient was observed for 3 days with resolution of the symptoms and was discharged on December 20, 2019. The CT enterography performed as an outpatient on January 16, 2020 revealed possible ileus versus atrial obstruction with dilatation of jejunal loops.  There is a small metallic structure in the lumen of the descending colon that appears to be a hemostatic clip.  Also noted was nonspecific 4 mm left lower lobe nodule and multiple indeterminate hypodense lesions in the kidneys bilaterally.  The patient was seen by his urologist, Dr. Still for the kidney lesions.  He is awaiting for an MRI of the abdomen with and without IV contrast.  The patient was followed by surgery, Dr. Enriquez during the hospitalization.  The patient was treated conservatively with NG tube with resolution of the bowel obstruction.  The patient developed belching after discharge.  The patient was observed for 3 days with resolution of the symptoms and was discharged to followup in the office.  I reviewed the blood work and imaging studies performed during the hospitalization.  The patient had a CT enterography performed as an outpatient on January 16, 2020 revealed a normal appendix, colonic diverticulosis without evidence of diverticulitis and no bowel obstruction or grossly thickened bowel wall.  The previously noted small bowel obstruction appears to have resolved.  The patient denies any prior surgery but admits to having radiation therapy for Prostate cancer. \par History of Diverticular Bleed: The patient was previously hospitalized at [facility] [place] with multiple episodes of hematochezia in August 1, 2018. The colonoscopy revealed severe diverticulosis. One active bleeding diverticula was identified and treated with hemoclipping and Epinephrine. The patient tolerated the procedure well.  \par Diverticulosis: I recommend a high-fiber diet and avoid seeds. The patient is to consider a trial of Metamucil once a day for fiber supplementation. The patient is to also consider a trial of a probiotic such as Align once a day. \par Follow-up: The patient is to follow-up in the office in 4 weeks to reassess the symptoms. The patient was told to call the office if any further problems. \par \par \par

## 2020-02-03 NOTE — REVIEW OF SYSTEMS
[Eyesight Problems] : eyesight problems [Sore Throat] : sore throat [Hoarseness] : hoarseness [Abdominal Pain] : abdominal pain [Vomiting] : vomiting [Heartburn] : heartburn [Nocturia] : nocturia [Negative] : Heme/Lymph [FreeTextEntry6] : stopped smoking in 1976 for 1 to 1 1/2 PPD x 20 years [FreeTextEntry8] : prostate cancer

## 2020-02-03 NOTE — HISTORY OF PRESENT ILLNESS
[Hospitalization] : was hospitalized [Vomiting] : denies vomiting [Nausea] : denies nausea [Yellow Skin Or Eyes (Jaundice)] : denies jaundice [Constipation] : denies constipation [Diarrhea] : denies diarrhea [Abdominal Pain] : denies abdominal pain [Rectal Pain] : denies rectal pain [Heartburn] : heartburn [Wt Loss ___ Lbs] : recent [unfilled] ~Upound(s) weight loss [Abdominal Swelling] : abdominal swelling [GERD] : gastroesophageal reflux disease [Wt Gain ___ Lbs] : no recent weight gain [Hiatus Hernia] : no hiatus hernia [Peptic Ulcer Disease] : no peptic ulcer disease [Pancreatitis] : no pancreatitis [Cholelithiasis] : no cholelithiasis [Kidney Stone] : no kidney stone [Inflammatory Bowel Disease] : no inflammatory bowel disease [Irritable Bowel Syndrome] : no irritable bowel syndrome [Diverticulitis] : no diverticulitis [Alcohol Abuse] : no alcohol abuse [Malignancy] : no malignancy [Abdominal Surgery] : no abdominal surgery [Appendectomy] : no appendectomy [Cholecystectomy] : no cholecystectomy [de-identified] : The blood work performed on December 18, 2019 revealed anemia with Hgb/Hct level of 12.8/38.9, respectively, an elevated CO2 of 32 mmol/L and elevated glucose of 122 mg/dl.   [de-identified] : The patient is a 79-year-old -American male with past medical history significant for prostate cancer, s/p radiation treatment, hypertension and hypercholesterolemia who was referred to my office by Dr. Moore for dyspepsia and gastroesophageal reflux disease. The patient also admits to having a history of diverticular bleed and partial small bowel obstruction. I was asked to render an opinion for consultation for the above complaints.   The patient states that he is feeling better.  The patient was recently hospitalized at the [facility] on December 17, 2019 for abdominal pain secondary to partial small bowel obstruction.   The patient was hospitalized for 3 days for the symptoms. The blood work performed on December 18, 2019 revealed anemia with Hgb/Hct level of 12.8/38.9, respectively, an elevated CO2 of 32 mmol/L and elevated glucose of 122 mg/dl.    The CAT scan of the abdomen and pelvis with IV contrast performed on December 17, 2019 revealed mid small bowel obstruction with mesenteric edema and small ascites with no pneumatosis or free air.  The  barium small bowel follow through performed on December 18, 2019 revealed no complete bowel obstruction and mild focal dilatation of a mid jejunal loops secondary to ileus or low-grade partial obstruction.  The patient was followed by surgery, Dr. Enriquez during the hospitalization.  The patient was treated conservatively with NG tube with resolution of the bowel obstruction.  The patient was observed for 3 days with resolution of the symptoms and was discharged to followup in the office.  I reviewed the blood work and imaging studies performed during the hospitalization.  The patient was discharged on December 20, 2019.  The patient developed belching after discharge.  The patient had a CT enterography performed as an outpatient on January 16, 2020 revealed a normal appendix, colonic diverticulosis without evidence of diverticulitis and no bowel obstruction or grossly thickened bowel wall.  There was a small metallic structure in the lumen of the descending colon and may represent a hemostatic clip.  Also noted was a nonspecific 4 mm left lower lobe nodule and multiple indeterminate hypodense lesions in the kidney bilaterally.  The previously noted small bowel obstruction appears to have resolved. The patient is being followed by his urologist, Dr. Still for the kidney lesions.  He is awaiting for an MRI of the abdomen with and without IV contrast.   The patient currently denies any abdominal pain.  The patient complains of belching, abdominal gas and bloating.  The patient currently denies any nausea or vomiting.  The patient complains of gastroesophageal reflux disease but denies any dysphagia. The gastroesophageal reflux disease is worse after meals and late at night and in the early morning. The gastroesophageal reflux disease is improved with proton pump inhibitors, H2 blockers and antacids.  The patient denies any atypical chest pain, shortness of breath or palpitations.  The patient denies any diaphoresis. The patient denies any constipation or diarrhea.  The patient has 1 to 2 bowel movements a day.  The patient denies a change in bowel habits.  The patient denies a change in caliber of stool.  The patient denies having mucus discharge with the bowel movements.  The patient denies any bright red blood per rectum, melena or hematemesis.  The patient denies any rectal pain or rectal pruritus. The patient complains of weight loss but denies any anorexia.  The patient admits to losing 3 to 4 pounds over the past 4 weeks.  He denies any fevers or chills.  The patient denies any jaundice or pruritus.  The patient denies any back pain.  The patient admits to having a prior colonoscopy performed by another gastroenterologist, Dr. Cody Ware.   The patient was previously hospitalized at [facility] [place] with multiple episodes of hematochezia in August 2018. The colonoscopy performed on August 1, 2019 revealed severe diverticulosis. One active bleeding diverticula was identified and treated with hemoclipping and Epinephrine. The patient tolerated the procedure well.   The patient required blood transfusion.  He was stabilized and discharged on August 6, 2019. The patient denies any significant family history of GI problems.

## 2020-02-11 ENCOUNTER — FORM ENCOUNTER (OUTPATIENT)
Age: 80
End: 2020-02-11

## 2020-02-12 ENCOUNTER — APPOINTMENT (OUTPATIENT)
Dept: MRI IMAGING | Facility: HOSPITAL | Age: 80
End: 2020-02-12
Payer: MEDICARE

## 2020-02-12 ENCOUNTER — OUTPATIENT (OUTPATIENT)
Dept: OUTPATIENT SERVICES | Facility: HOSPITAL | Age: 80
LOS: 1 days | End: 2020-02-12
Payer: MEDICARE

## 2020-02-12 DIAGNOSIS — N28.9 DISORDER OF KIDNEY AND URETER, UNSPECIFIED: ICD-10-CM

## 2020-02-12 PROCEDURE — 74183 MRI ABD W/O CNTR FLWD CNTR: CPT

## 2020-02-12 PROCEDURE — 74183 MRI ABD W/O CNTR FLWD CNTR: CPT | Mod: 26

## 2020-02-13 ENCOUNTER — RESULT REVIEW (OUTPATIENT)
Age: 80
End: 2020-02-13

## 2020-02-13 ENCOUNTER — OUTPATIENT (OUTPATIENT)
Dept: OUTPATIENT SERVICES | Facility: HOSPITAL | Age: 80
LOS: 1 days | End: 2020-02-13
Payer: MEDICARE

## 2020-02-13 ENCOUNTER — APPOINTMENT (OUTPATIENT)
Dept: GASTROENTEROLOGY | Facility: HOSPITAL | Age: 80
End: 2020-02-13

## 2020-02-13 DIAGNOSIS — K21.9 GASTRO-ESOPHAGEAL REFLUX DISEASE WITHOUT ESOPHAGITIS: ICD-10-CM

## 2020-02-13 PROCEDURE — 88312 SPECIAL STAINS GROUP 1: CPT

## 2020-02-13 PROCEDURE — 88312 SPECIAL STAINS GROUP 1: CPT | Mod: 26

## 2020-02-13 PROCEDURE — 88305 TISSUE EXAM BY PATHOLOGIST: CPT | Mod: 26

## 2020-02-13 PROCEDURE — 88305 TISSUE EXAM BY PATHOLOGIST: CPT

## 2020-02-13 PROCEDURE — 43239 EGD BIOPSY SINGLE/MULTIPLE: CPT

## 2020-02-18 LAB — SURGICAL PATHOLOGY STUDY: SIGNIFICANT CHANGE UP

## 2020-02-26 ENCOUNTER — APPOINTMENT (OUTPATIENT)
Dept: UROLOGY | Facility: CLINIC | Age: 80
End: 2020-02-26
Payer: MEDICARE

## 2020-02-26 VITALS
SYSTOLIC BLOOD PRESSURE: 158 MMHG | BODY MASS INDEX: 25.77 KG/M2 | DIASTOLIC BLOOD PRESSURE: 80 MMHG | HEIGHT: 70 IN | WEIGHT: 180 LBS | HEART RATE: 80 BPM

## 2020-02-26 PROCEDURE — 99213 OFFICE O/P EST LOW 20 MIN: CPT

## 2020-03-04 NOTE — HISTORY OF PRESENT ILLNESS
[FreeTextEntry1] : cc f/u prostate ca\par [place] 4+3 prostate ca\par \par S/p xrt \par Completed 6/17\par Doing well\par urination improved on tamsulosin but stopped neil to congestion rapaflo still gave orthostatic\par psa 0.21 11/18  0.2 8/19  0.2 1/20\par poor erections worsening last year difficulty maintaining  \par ok results 80 mg sildenafil libido decreased - test low but no current partner\par \par \par recent ct showed britney indetermiante renal lesion\par f/u mri 4.5 cm bosniak 2f

## 2020-03-04 NOTE — ASSESSMENT
[FreeTextEntry1] : mri images reviewed \par bosniak classification reviewed\par low risk for malignancy \par f/u images 6 month

## 2020-04-28 NOTE — PATIENT PROFILE ADULT. - PROVIDER NOTIFICATION NAME
Spoke with Shanta, she was informed of 3 months supply of Euthyrox sent to pharmacy.    TSH lab to be completed in 8 weeks.    Monae Izaguirre RN     Tavon vogt

## 2020-05-27 ENCOUNTER — APPOINTMENT (OUTPATIENT)
Dept: GASTROENTEROLOGY | Facility: CLINIC | Age: 80
End: 2020-05-27
Payer: MEDICARE

## 2020-05-27 VITALS
BODY MASS INDEX: 26.11 KG/M2 | SYSTOLIC BLOOD PRESSURE: 150 MMHG | DIASTOLIC BLOOD PRESSURE: 71 MMHG | WEIGHT: 182 LBS | OXYGEN SATURATION: 98 % | TEMPERATURE: 98.1 F | HEART RATE: 89 BPM

## 2020-05-27 PROCEDURE — 99213 OFFICE O/P EST LOW 20 MIN: CPT

## 2020-05-27 NOTE — HISTORY OF PRESENT ILLNESS
[Heartburn] : denies heartburn [None] : had no significant interval events [Nausea] : denies nausea [Vomiting] : denies vomiting [Diarrhea] : denies diarrhea [Abdominal Pain] : denies abdominal pain [Yellow Skin Or Eyes (Jaundice)] : denies jaundice [Rectal Pain] : denies rectal pain [Wt Gain ___ Lbs] : recent [unfilled] ~Upound(s) weight gain [Constipation] : constipation [Abdominal Swelling] : abdominal swelling [Hiatus Hernia] : hiatus hernia [Wt Loss ___ Lbs] : no recent weight loss [GERD] : no gastroesophageal reflux disease [Peptic Ulcer Disease] : no peptic ulcer disease [Pancreatitis] : no pancreatitis [Cholelithiasis] : no cholelithiasis [Kidney Stone] : no kidney stone [Inflammatory Bowel Disease] : no inflammatory bowel disease [Irritable Bowel Syndrome] : no irritable bowel syndrome [Diverticulitis] : no diverticulitis [Alcohol Abuse] : no alcohol abuse [Malignancy] : no malignancy [Abdominal Surgery] : no abdominal surgery [Appendectomy] : no appendectomy [Cholecystectomy] : no cholecystectomy [de-identified] : The patient states that he is feeling uncomfortable.   The patient denies being exposed to COVID 19 infection.  He denies any viral symptoms recently.  The patient denies any jaundice or pruritus.  The patient denies any chronic lower back pain. The patient denies any abdominal pain.  The patient complains of abdominal gas and bloating.  The patient denies any nausea or vomiting.  The patient denies any gastroesophageal reflux disease or dysphagia.  The patient denies any atypical chest pain, shortness of breath or palpitations.  The patient denies any diaphoresis. The patient complains of occasional constipation but denies any diarrhea.  The patient has 1 to 2 bowel movements a day.  The patient complains of a change in bowel habits.  The patient complains of a change in caliber of stool.   The patient denies having mucus discharge with the bowel movements.  The patient denies any bright red blood per rectum, melena or hematemesis.  The patient denies any rectal pain or rectal pruritus.  The patient denies any weight loss or anorexia.  The patient admits to gaining weight recently.  He denies any fevers or chills.  The patient had an upper endoscopy performed at the [facility] GI endoscopy suite on February 13, 2020. The upper endoscopy was performed up to the level of the second portion of the duodenum. The upper endoscopy revealed a small hiatal hernia, mild diffuse gastritis and mild duodenal polyps. Biopsies were taken of the distal esophagus, antrum, body of stomach, duodenal polyps and duodenum to assess for esophagitis, gastritis and duodenitis. The pathology revealed distal esophagus with fragments of unremarkable squamous esophageal epithelium that was negative for fungal microorganisms, gastric antral and body mucosa with chronic inactive gastritis that was negative for Helicobacter pylori, duodenal mucosa with regenerative hyperplasia of the villi with prominent submucosal Brunner's glands with no parasites were increased intraepithelial lymphocytes that was negative for Helicobacter pylori ( descending duodenal polyp), duodenal mucosa with preserved villous architecture with no parasites are increased intraepithelial lymphocytes, fragments of pyloroduodenal junction with polypoid foveolar hyperplasia that was negative for Helicobacter pylori (duodenum) and duodenal mucosa with Brunner's gland hyperplasia but no parasites or increased intraepithelial lymphocytes and was negative for Helicobacter pylori (duodenal bulb polyp). The patient tolerated the procedure well.  The patient had a history of diverticular bleed and partial small bowel obstruction. The patient was recently hospitalized at the [facility] on December 17, 2019 for abdominal pain secondary to partial small bowel obstruction. The patient was hospitalized for 3 days for the symptoms. The blood work performed on December 18, 2019 revealed anemia with Hgb/Hct level of 12.8/38.9, respectively, an elevated CO2 of 32 mmol/L and elevated glucose of 122 mg/dl. The CAT scan of the abdomen and pelvis with IV contrast performed on December 17, 2019 revealed mid small bowel obstruction with mesenteric edema and small ascites with no pneumatosis or free air. The barium small bowel follow through performed on December 18, 2019 revealed no complete bowel obstruction and mild focal dilatation of a mid jejunal loops secondary to ileus or low-grade partial obstruction. The patient was followed by surgery, Dr. Enriquez during the hospitalization. The patient was treated conservatively with NG tube with resolution of the bowel obstruction. The patient was observed for 3 days with resolution of the symptoms and was discharged to followup in the office. I reviewed the blood work and imaging studies performed during the hospitalization. The patient was discharged on December 20, 2019. The patient developed belching after discharge. The patient had a CT enterography performed as an outpatient on January 16, 2020 revealed a normal appendix, colonic diverticulosis without evidence of diverticulitis and no bowel obstruction or grossly thickened bowel wall. There was a small metallic structure in the lumen of the descending colon and may represent a hemostatic clip. Also noted was a nonspecific 4 mm left lower lobe nodule and multiple indeterminate hypodense lesions in the kidney bilaterally. The previously noted small bowel obstruction appears to have resolved. The patient is being followed by his urologist, Dr. Still for the kidney lesions. He is awaiting for an MRI of the abdomen with and without IV contrast. The patient admits to having a prior colonoscopy performed by another gastroenterologist, Dr. Cody Ware. The patient was previously hospitalized at [facility] with multiple episodes of hematochezia in August 2018. The colonoscopy performed on August 1, 2019 revealed severe diverticulosis. One active bleeding diverticula was identified and treated with hemoclipping and Epinephrine. The patient tolerated the procedure well. The patient required blood transfusion. He was stabilized and discharged on August 6, 2019. The patient denies any significant family history of GI problems.

## 2020-05-27 NOTE — ASSESSMENT
[FreeTextEntry1] : Dyspepsia: The patient complains of dyspeptic symptoms.  The patient was advised to abide by an anti-gas diet.  The patient was given a pamphlet for a low FOD-MAP diet.  The patient and I reviewed the low FOD-MAP diet at length. The patient is to start on a trial of Phazyme one tablet 3 times a day p.r.n. abdominal pain and gas.\par Hiatal Hernia:  The patient was advised to avoid late-night meals and dietary indiscretions.  The patient was advised to avoid fried and fatty foods.  The patient was advised to abide by an anti-GERD diet. The patient was given a pamphlet for anti-GERD.  The patient and I reviewed the anti-GERD diet at length. I recommend a trial of Pantoprazole 40 mg once a day for 3 months for the symptoms.\par Gastritis: The patient has a history of gastritis. The patient is to avoid nonsteroidal anti-inflammatory drugs and aspirin. I recommend a trial of pantoprazole 40 mg once a day for 3 months for the symptoms. \par Duodena Polyps:   The patient was found to have duodenal polyps on recent upper endoscopy.  The pathology revealed fundic gland polyps.  The patient and I discussed the risks of fundic gland polyps.  The patient was told him the possibility of increasing size and bleeding secondary to duodenal polyps.  The patient was advised to follow up in the office to reassess the duodenal polyps.\par History of Small Bowel Obstruction: The patient was previously hospitalized at the [facility] for partial small bowel obstruction. The patient had blood work and imaging studies to assess the symptoms. I reviewed the hospital records, blood work and imaging studies performed in the hospital. \par Partial Small Bowel Obstruction: The patient was previously hospitalized at the [facility] [place] on December 17, 2019 for abdominal pain secondary to partial small bowel obstruction. The patient was hospitalized for 3 days for the symptoms. The blood work revealed mild anemia. The CAT scan of the abdomen and pelvis with IV contrast performed revealed a normal appendix, colonic diverticulosis without evidence of diverticulitis and no bowel obstruction or grossly thickened bowel wall. The previously noted small bowel obstruction appears to have resolved. The barium small bowel follow through performed on December 18, 2019 revealed no complete bowel obstruction and mild focal dilatation of a mid jejunal loops secondary to ileus or low-grade partial obstruction. The patient was followed by surgery, Dr. Enriquez during the hospitalization. The patient was treated conservatively with NG tube with resolution of the bowel obstruction. The patient was observed for 3 days with resolution of the symptoms and was discharged on December 20, 2019. The CT enterography performed as an outpatient on January 16, 2020 revealed possible ileus versus atrial obstruction with dilatation of jejunal loops. There is a small metallic structure in the lumen of the descending colon that appears to be a hemostatic clip. Also noted was nonspecific 4 mm left lower lobe nodule and multiple indeterminate hypodense lesions in the kidneys bilaterally. The patient was seen by his urologist, Dr. Still for the kidney lesions. He is awaiting for an MRI of the abdomen with and without IV contrast. The patient was followed by surgery, Dr. Enriquez during the hospitalization. The patient was treated conservatively with NG tube with resolution of the bowel obstruction. The patient developed belching after discharge. The patient was observed for 3 days with resolution of the symptoms and was discharged to followup in the office. I reviewed the blood work and imaging studies performed during the hospitalization. The patient had a CT enterography performed as an outpatient on January 16, 2020 revealed a normal appendix, colonic diverticulosis without evidence of diverticulitis and no bowel obstruction or grossly thickened bowel wall. The previously noted small bowel obstruction appears to have resolved. The patient denies any prior surgery but admits to having radiation therapy for Prostate cancer. \par History of Diverticular Bleed: The patient was previously hospitalized at [facility] [place] with multiple episodes of hematochezia in August 1, 2018. The colonoscopy revealed severe diverticulosis. One active bleeding diverticula was identified and treated with hemoclipping and Epinephrine. The patient tolerated the procedure well. \par Diverticulosis: I recommend a low FOD-MAP diet and avoid seeds. The patient is to avoid Metamucil once a day for fiber supplementation. The patient is to also consider a trial of a probiotic such as Align once a day. \par Follow-up: The patient is to follow-up in the office in 3 months to reassess the symptoms. The patient was told to call the office if any further problems.

## 2020-06-10 ENCOUNTER — RX RENEWAL (OUTPATIENT)
Age: 80
End: 2020-06-10

## 2020-06-10 RX ORDER — PANTOPRAZOLE 40 MG/1
40 TABLET, DELAYED RELEASE ORAL DAILY
Qty: 30 | Refills: 3 | Status: ACTIVE | COMMUNITY
Start: 2020-02-03 | End: 1900-01-01

## 2020-08-26 ENCOUNTER — APPOINTMENT (OUTPATIENT)
Dept: GASTROENTEROLOGY | Facility: CLINIC | Age: 80
End: 2020-08-26
Payer: MEDICARE

## 2020-08-26 VITALS
HEIGHT: 70 IN | DIASTOLIC BLOOD PRESSURE: 75 MMHG | BODY MASS INDEX: 25.77 KG/M2 | WEIGHT: 180 LBS | TEMPERATURE: 97.5 F | SYSTOLIC BLOOD PRESSURE: 148 MMHG | OXYGEN SATURATION: 98 % | HEART RATE: 85 BPM

## 2020-08-26 DIAGNOSIS — R10.13 EPIGASTRIC PAIN: ICD-10-CM

## 2020-08-26 PROCEDURE — 99213 OFFICE O/P EST LOW 20 MIN: CPT

## 2020-08-26 NOTE — HISTORY OF PRESENT ILLNESS
[None] : had no significant interval events [Nausea] : denies nausea [Heartburn] : denies heartburn [Constipation] : denies constipation [Vomiting] : denies vomiting [Diarrhea] : denies diarrhea [Yellow Skin Or Eyes (Jaundice)] : denies jaundice [Abdominal Pain] : denies abdominal pain [Wt Loss ___ Lbs] : recent [unfilled] ~Upound(s) weight loss [Rectal Pain] : denies rectal pain [Abdominal Swelling] : abdominal swelling [Hiatus Hernia] : hiatus hernia [Wt Gain ___ Lbs] : no recent weight gain [GERD] : no gastroesophageal reflux disease [Peptic Ulcer Disease] : no peptic ulcer disease [Pancreatitis] : no pancreatitis [Cholelithiasis] : no cholelithiasis [Kidney Stone] : no kidney stone [Inflammatory Bowel Disease] : no inflammatory bowel disease [Diverticulitis] : no diverticulitis [Irritable Bowel Syndrome] : no irritable bowel syndrome [Alcohol Abuse] : no alcohol abuse [Malignancy] : no malignancy [Abdominal Surgery] : no abdominal surgery [Appendectomy] : no appendectomy [Cholecystectomy] : no cholecystectomy [de-identified] : The patient denies being exposed to COVID 19 infection. He denies any viral symptoms recently. The patient states that he is feeling uncomfortable x 2 weeks.  He complains of belching after eating. . The patient denies any jaundice or pruritus.  The patient denies any chronic lower back pain. The patient denies any abdominal pain.  The patient complains of abdominal gas and bloating.  The patient denies any nausea or vomiting.  The patient denies any gastroesophageal reflux disease or dysphagia.  The patient denies any atypical chest pain, shortness of breath or palpitations.  The patient denies any diaphoresis. The patient denies any constipation or diarrhea.  The patient has 1 to 2 bowel movements a day.   The patient denies a change in bowel habits.  The patient denies a change in caliber of stool.  The patient denies having mucus discharge with the bowel movements.  The patient denies any bright red blood per rectum, melena or hematemesis.  The patient denies any rectal pain or rectal pruritus.  The patient complains of weight loss but denies any anorexia.  The patient admits to losing 4 pounds over the past 2 months.  He denies any fevers or chills.  The patient had an upper endoscopy performed at the [facility] GI endoscopy suite on February 13, 2020. The upper endoscopy was performed up to the level of the second portion of the duodenum. The upper endoscopy revealed a small hiatal hernia, mild diffuse gastritis and mild duodenal polyps. Biopsies were taken of the distal esophagus, antrum, body of stomach, duodenal polyps and duodenum to assess for esophagitis, gastritis and duodenitis. The pathology revealed distal esophagus with fragments of unremarkable squamous esophageal epithelium that was negative for fungal microorganisms, gastric antral and body mucosa with chronic inactive gastritis that was negative for Helicobacter pylori, duodenal mucosa with regenerative hyperplasia of the villi with prominent submucosal Brunner's glands with no parasites were increased intraepithelial lymphocytes that was negative for Helicobacter pylori ( descending duodenal polyp), duodenal mucosa with preserved villous architecture with no parasites are increased intraepithelial lymphocytes, fragments of pyloroduodenal junction with polypoid foveolar hyperplasia that was negative for Helicobacter pylori (duodenum) and duodenal mucosa with Brunner's gland hyperplasia but no parasites or increased intraepithelial lymphocytes and was negative for Helicobacter pylori (duodenal bulb polyp). The patient tolerated the procedure well. The patient had a history of diverticular bleed and partial small bowel obstruction. The patient was recently hospitalized at the [facility] on December 17, 2019 for abdominal pain secondary to partial small bowel obstruction. The patient was hospitalized for 3 days for the symptoms. The blood work performed on December 18, 2019 revealed anemia with Hgb/Hct level of 12.8/38.9, respectively, an elevated CO2 of 32 mmol/L and elevated glucose of 122 mg/dl. The CAT scan of the abdomen and pelvis with IV contrast performed on December 17, 2019 revealed mid small bowel obstruction with mesenteric edema and small ascites with no pneumatosis or free air. The barium small bowel follow through performed on December 18, 2019 revealed no complete bowel obstruction and mild focal dilatation of a mid jejunal loops secondary to ileus or low-grade partial obstruction. The patient was followed by surgery, Dr. Enriquez during the hospitalization. The patient was treated conservatively with NG tube with resolution of the bowel obstruction. The patient was observed for 3 days with resolution of the symptoms and was discharged to followup in the office. I reviewed the blood work and imaging studies performed during the hospitalization. The patient was discharged on December 20, 2019. The patient developed belching after discharge. The patient had a CT enterography performed as an outpatient on January 16, 2020 revealed a normal appendix, colonic diverticulosis without evidence of diverticulitis and no bowel obstruction or grossly thickened bowel wall. There was a small metallic structure in the lumen of the descending colon and may represent a hemostatic clip. Also noted was a nonspecific 4 mm left lower lobe nodule and multiple indeterminate hypodense lesions in the kidney bilaterally. The previously noted small bowel obstruction appears to have resolved. The patient is being followed by his urologist, Dr. Still for the kidney lesions. He is awaiting for an MRI of the abdomen with and without IV contrast. The patient admits to having a prior colonoscopy performed by another gastroenterologist, Dr. Cody Ware. The patient was previously hospitalized at [facility] with multiple episodes of hematochezia in August 2018. The colonoscopy performed on August 1, 2019 revealed severe diverticulosis. One active bleeding diverticula was identified and treated with hemoclipping and Epinephrine. The patient tolerated the procedure well. The patient required blood transfusion. He was stabilized and discharged on August 6, 2019. The patient denies any significant family history of GI problems.  [de-identified] : (+) prior smoking 2PPD x20 years stopped x 35 years, (-) ETOH, (-) IVDA\par

## 2020-08-26 NOTE — ASSESSMENT
[FreeTextEntry1] : Dyspepsia: The patient complains of dyspeptic symptoms.  The patient was advised to abide by an anti-gas (low FOD-MAP) diet.  The patient was given a pamphlet for anti-gas.  The patient and I reviewed the anti-gas diet at length. The patient is to start on a trial of charcoal tablets one tablet 3 times a day p.r.n. abdominal pain and gas.\par I recommend a trial of Carafate 1 gram 4 times a day x 3 months for the symptoms.   If the symptoms persist, the patient may require a trial of Reglan.\par Hiatal Hernia: The patient was advised to avoid late-night meals and dietary indiscretions. The patient was advised to avoid fried and fatty foods. The patient was advised to abide by an anti-GERD diet. The patient was given a pamphlet for anti-GERD. The patient and I reviewed the anti-GERD diet at length. I recommend a trial of Carafate 1 gram 4 times a day x 3 months for the symptoms.  If the symptoms persist, the patient may require a trial of Reglan.\par Gastritis: The patient has a history of gastritis. The patient is to avoid nonsteroidal anti-inflammatory drugs and aspirin. I recommend a trial of Carafate 1 gram 4 times a day x 3 months for the symptoms.\par Duodena Polyps: The patient was found to have duodenal polyps on recent upper endoscopy. The pathology revealed fundic gland polyps. The patient and I discussed the risks of fundic gland polyps. The patient was told him the possibility of increasing size and bleeding secondary to duodenal polyps. The patient was advised to follow up in the office to reassess the duodenal polyps.\par History of Small Bowel Obstruction: The patient was previously hospitalized at the [facility] for partial small bowel obstruction. The patient had blood work and imaging studies to assess the symptoms. I reviewed the hospital records, blood work and imaging studies performed in the hospital. \par Partial Small Bowel Obstruction: The patient was previously hospitalized at the [facility] [place] on December 17, 2019 for abdominal pain secondary to partial small bowel obstruction. The patient was hospitalized for 3 days for the symptoms. The blood work revealed mild anemia. The CAT scan of the abdomen and pelvis with IV contrast performed revealed a normal appendix, colonic diverticulosis without evidence of diverticulitis and no bowel obstruction or grossly thickened bowel wall. The previously noted small bowel obstruction appears to have resolved. The barium small bowel follow through performed on December 18, 2019 revealed no complete bowel obstruction and mild focal dilatation of a mid jejunal loops secondary to ileus or low-grade partial obstruction. The patient was followed by surgery, Dr. Enriquez during the hospitalization. The patient was treated conservatively with NG tube with resolution of the bowel obstruction. The patient was observed for 3 days with resolution of the symptoms and was discharged on December 20, 2019. The CT enterography performed as an outpatient on January 16, 2020 revealed possible ileus versus atrial obstruction with dilatation of jejunal loops. There is a small metallic structure in the lumen of the descending colon that appears to be a hemostatic clip. Also noted was nonspecific 4 mm left lower lobe nodule and multiple indeterminate hypodense lesions in the kidneys bilaterally. The patient was seen by his urologist, Dr. Still for the kidney lesions. He is awaiting for an MRI of the abdomen with and without IV contrast. The patient was followed by surgery, Dr. Enriquez during the hospitalization. The patient was treated conservatively with NG tube with resolution of the bowel obstruction. The patient developed belching after discharge. The patient was observed for 3 days with resolution of the symptoms and was discharged to followup in the office. I reviewed the blood work and imaging studies performed during the hospitalization. The patient had a CT enterography performed as an outpatient on January 16, 2020 revealed a normal appendix, colonic diverticulosis without evidence of diverticulitis and no bowel obstruction or grossly thickened bowel wall. The previously noted small bowel obstruction appears to have resolved. The patient denies any prior surgery but admits to having radiation therapy for Prostate cancer. \par History of Diverticular Bleed: The patient was previously hospitalized at [facility] [place] with multiple episodes of hematochezia in August 1, 2018. The colonoscopy revealed severe diverticulosis. One active bleeding diverticula was identified and treated with hemoclipping and Epinephrine. The patient tolerated the procedure well. \par Diverticulosis: I recommend a low FOD-MAP diet and avoid seeds. The patient is to avoid Metamucil once a day for fiber supplementation. The patient is to also consider a trial of a probiotic such as Align once a day. \par Follow-up: The patient is to follow-up in the office in 3 months to reassess the symptoms. The patient was told to call the office if any further problems. \par \par \par \par

## 2020-09-03 ENCOUNTER — APPOINTMENT (OUTPATIENT)
Dept: UROLOGY | Facility: CLINIC | Age: 80
End: 2020-09-03
Payer: MEDICARE

## 2020-09-03 VITALS — DIASTOLIC BLOOD PRESSURE: 75 MMHG | TEMPERATURE: 97.3 F | HEART RATE: 94 BPM | SYSTOLIC BLOOD PRESSURE: 157 MMHG

## 2020-09-03 LAB
ANION GAP SERPL CALC-SCNC: 11 MMOL/L
BUN SERPL-MCNC: 19 MG/DL
CALCIUM SERPL-MCNC: 9.9 MG/DL
CHLORIDE SERPL-SCNC: 101 MMOL/L
CO2 SERPL-SCNC: 27 MMOL/L
CREAT SERPL-MCNC: 1.16 MG/DL
GLUCOSE SERPL-MCNC: 99 MG/DL
POTASSIUM SERPL-SCNC: 4.6 MMOL/L
PSA SERPL-MCNC: 0.24 NG/ML
SODIUM SERPL-SCNC: 140 MMOL/L

## 2020-09-03 PROCEDURE — 99214 OFFICE O/P EST MOD 30 MIN: CPT

## 2020-09-03 NOTE — ASSESSMENT
[FreeTextEntry1] : cap \par doing well forrest\par cont regular psa \par \par renal cyst \par f/u mri \par \par voiding well off meds\par \par \par h/o androgen deficiency \par reviewed risk and benefits of treatment in setting of cap \par currently no sexual partner

## 2020-09-03 NOTE — HISTORY OF PRESENT ILLNESS
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

## 2020-09-03 NOTE — PHYSICAL EXAM
[General Appearance - Well Developed] : well developed [General Appearance - Well Nourished] : well nourished [General Appearance - In No Acute Distress] : no acute distress [Normal Appearance] : normal appearance [Well Groomed] : well groomed [Abdomen Soft] : soft [Abdomen Tenderness] : non-tender [Costovertebral Angle Tenderness] : no ~M costovertebral angle tenderness [Urinary Bladder Findings] : the bladder was normal on palpation [Urethral Meatus] : meatus normal [Scrotum] : the scrotum was normal [Testes Mass (___cm)] : there were no testicular masses [No Prostate Nodules] : no prostate nodules [] : no respiratory distress [Edema] : no peripheral edema [Respiration, Rhythm And Depth] : normal respiratory rhythm and effort [Oriented To Time, Place, And Person] : oriented to person, place, and time [Affect] : the affect was normal [Exaggerated Use Of Accessory Muscles For Inspiration] : no accessory muscle use [Mood] : the mood was normal [Not Anxious] : not anxious [Normal Station and Gait] : the gait and station were normal for the patient's age [No Palpable Adenopathy] : no palpable adenopathy [No Focal Deficits] : no focal deficits

## 2020-09-17 ENCOUNTER — RX RENEWAL (OUTPATIENT)
Age: 80
End: 2020-09-17

## 2020-09-17 RX ORDER — SUCRALFATE 1 G/1
1 TABLET ORAL 4 TIMES DAILY
Qty: 120 | Refills: 0 | Status: ACTIVE | COMMUNITY
Start: 2020-08-26 | End: 1900-01-01

## 2020-10-06 ENCOUNTER — OUTPATIENT (OUTPATIENT)
Dept: OUTPATIENT SERVICES | Facility: HOSPITAL | Age: 80
LOS: 1 days | End: 2020-10-06
Payer: MEDICARE

## 2020-10-06 ENCOUNTER — APPOINTMENT (OUTPATIENT)
Dept: MRI IMAGING | Facility: HOSPITAL | Age: 80
End: 2020-10-06
Payer: MEDICARE

## 2020-10-06 DIAGNOSIS — N28.9 DISORDER OF KIDNEY AND URETER, UNSPECIFIED: ICD-10-CM

## 2020-10-06 PROCEDURE — 74183 MRI ABD W/O CNTR FLWD CNTR: CPT | Mod: 26

## 2020-10-06 PROCEDURE — 74183 MRI ABD W/O CNTR FLWD CNTR: CPT

## 2020-11-23 ENCOUNTER — APPOINTMENT (OUTPATIENT)
Dept: GASTROENTEROLOGY | Facility: CLINIC | Age: 80
End: 2020-11-23
Payer: MEDICARE

## 2020-11-23 VITALS
DIASTOLIC BLOOD PRESSURE: 74 MMHG | WEIGHT: 180 LBS | HEIGHT: 70 IN | OXYGEN SATURATION: 97 % | TEMPERATURE: 97.9 F | SYSTOLIC BLOOD PRESSURE: 149 MMHG | BODY MASS INDEX: 25.77 KG/M2 | HEART RATE: 77 BPM

## 2020-11-23 DIAGNOSIS — Z87.19 PERSONAL HISTORY OF OTHER DISEASES OF THE DIGESTIVE SYSTEM: ICD-10-CM

## 2020-11-23 DIAGNOSIS — K80.20 CALCULUS OF GALLBLADDER W/OUT CHOLECYSTITIS W/OUT OBSTRUCTION: ICD-10-CM

## 2020-11-23 DIAGNOSIS — K21.9 GASTRO-ESOPHAGEAL REFLUX DISEASE W/OUT ESOPHAGITIS: ICD-10-CM

## 2020-11-23 DIAGNOSIS — K56.600 PARTIAL INTESTINAL OBSTRUCTION, UNSPECIFIED AS TO CAUSE: ICD-10-CM

## 2020-11-23 PROCEDURE — 99214 OFFICE O/P EST MOD 30 MIN: CPT

## 2020-11-23 RX ORDER — FAMOTIDINE 20 MG/1
20 TABLET, FILM COATED ORAL
Qty: 60 | Refills: 0 | Status: ACTIVE | COMMUNITY
Start: 2020-10-26

## 2020-11-23 RX ORDER — DILTIAZEM HYDROCHLORIDE 120 MG/1
120 CAPSULE, EXTENDED RELEASE ORAL
Qty: 180 | Refills: 0 | Status: ACTIVE | COMMUNITY
Start: 2020-07-28

## 2020-11-23 RX ORDER — HYDROCHLOROTHIAZIDE 12.5 MG/1
12.5 TABLET ORAL
Qty: 90 | Refills: 0 | Status: ACTIVE | COMMUNITY
Start: 2020-11-09

## 2020-11-23 RX ORDER — FAMOTIDINE 40 MG/1
40 TABLET, FILM COATED ORAL
Qty: 60 | Refills: 3 | Status: ACTIVE | COMMUNITY
Start: 2020-11-23 | End: 1900-01-01

## 2020-11-23 NOTE — HISTORY OF PRESENT ILLNESS
[None] : had no significant interval events [Heartburn] : denies heartburn [Nausea] : denies nausea [Vomiting] : denies vomiting [Diarrhea] : denies diarrhea [Constipation] : denies constipation [Yellow Skin Or Eyes (Jaundice)] : denies jaundice [Abdominal Pain] : denies abdominal pain [Abdominal Swelling] : denies abdominal swelling [Rectal Pain] : denies rectal pain [Wt Gain ___ Lbs] : no recent weight gain [Wt Loss ___ Lbs] : no recent weight loss [GERD] : no gastroesophageal reflux disease [Hiatus Hernia] : no hiatus hernia [Peptic Ulcer Disease] : no peptic ulcer disease [Pancreatitis] : no pancreatitis [Cholelithiasis] : no cholelithiasis [Kidney Stone] : no kidney stone [Inflammatory Bowel Disease] : no inflammatory bowel disease [Irritable Bowel Syndrome] : no irritable bowel syndrome [Diverticulitis] : no diverticulitis [Alcohol Abuse] : no alcohol abuse [Malignancy] : no malignancy [Abdominal Surgery] : no abdominal surgery [Appendectomy] : no appendectomy [Cholecystectomy] : no cholecystectomy [de-identified] : The patient states that he is feeling better. The patient denies any jaundice or pruritus.  The patient denies any chronic lower back pain. The patient denies any abdominal pain.  The patient denies any abdominal gas and bloating.  The patient denies any nausea or vomiting.  The patient denies any gastroesophageal reflux disease or dysphagia.  The patient denies any atypical chest pain, shortness of breath or palpitations.  The patient denies any diaphoresis. The patient denies any constipation or diarrhea.  The patient has 1 to 2 bowel movements a day.  The patient denies a change in bowel habits.  The patient denies a change in caliber of stool.  The patient denies having mucus discharge with the bowel movements.  The patient denies any bright red blood per rectum, melena or hematemesis.  The patient denies any rectal pain or rectal pruritus.  The patient denies any weight loss or anorexia.  He denies any fevers or chills.  The patient denies being exposed to COVID 19 infection. He denies any viral symptoms recently. The patient had an upper endoscopy performed at the [facility] GI endoscopy suite on February 13, 2020. The upper endoscopy was performed up to the level of the second portion of the duodenum. The upper endoscopy revealed a small hiatal hernia, mild diffuse gastritis and mild duodenal polyps. Biopsies were taken of the distal esophagus, antrum, body of stomach, duodenal polyps and duodenum to assess for esophagitis, gastritis and duodenitis. The pathology revealed distal esophagus with fragments of unremarkable squamous esophageal epithelium that was negative for fungal microorganisms, gastric antral and body mucosa with chronic inactive gastritis that was negative for Helicobacter pylori, duodenal mucosa with regenerative hyperplasia of the villi with prominent submucosal Brunner's glands with no parasites were increased intraepithelial lymphocytes that was negative for Helicobacter pylori ( descending duodenal polyp), duodenal mucosa with preserved villous architecture with no parasites are increased intraepithelial lymphocytes, fragments of pyloroduodenal junction with polypoid foveolar hyperplasia that was negative for Helicobacter pylori (duodenum) and duodenal mucosa with Brunner's gland hyperplasia but no parasites or increased intraepithelial lymphocytes and was negative for Helicobacter pylori (duodenal bulb polyp). The patient tolerated the procedure well. The patient had a history of diverticular bleed and partial small bowel obstruction. The patient was recently hospitalized at the [facility] on December 17, 2019 for abdominal pain secondary to partial small bowel obstruction. The patient was hospitalized for 3 days for the symptoms. The blood work performed on December 18, 2019 revealed anemia with Hgb/Hct level of 12.8/38.9, respectively, an elevated CO2 of 32 mmol/L and elevated glucose of 122 mg/dl. The MRI of the abdomen with IV contrast performed on October 7, 2020 revealed cholelithiasis and stable 4.5 cm Bosniak 2 F lesion in the upper pole of the right kidney.  The CAT scan of the abdomen and pelvis with IV contrast performed on December 17, 2019 revealed mid small bowel obstruction with mesenteric edema and small ascites with no pneumatosis or free air. The barium small bowel follow through performed on December 18, 2019 revealed no complete bowel obstruction and mild focal dilatation of a mid jejunal loops secondary to ileus or low-grade partial obstruction. The patient was followed by surgery, Dr. Enriquez during the hospitalization. The patient was treated conservatively with NG tube with resolution of the bowel obstruction. The patient was observed for 3 days with resolution of the symptoms and was discharged to followup in the office. I reviewed the blood work and imaging studies performed during the hospitalization. The patient was discharged on December 20, 2019. The patient developed belching after discharge.   The patient is being followed by his urologist Dr. Jerel Corrales for prostate cancer, s/p radiation therapy.  The PSA had been stable at 0.2 ng/ml.  The patient had a CT enterography performed as an outpatient on January 16, 2020 revealed a normal appendix, colonic diverticulosis without evidence of diverticulitis and no bowel obstruction or grossly thickened bowel wall. There was a small metallic structure in the lumen of the descending colon and may represent a hemostatic clip. Also noted was a nonspecific 4 mm left lower lobe nodule and multiple indeterminate hypodense lesions in the kidney bilaterally. The previously noted small bowel obstruction appears to have resolved. The patient is being followed by his urologist, Dr. Still for the kidney lesions. He is awaiting for an MRI of the abdomen with and without IV contrast. The patient admits to having a prior colonoscopy performed by another gastroenterologist, Dr. Cody Ware. The patient was previously hospitalized at [facility] [place] with multiple episodes of hematochezia in August 2018. The colonoscopy performed on August 1, 2019 revealed severe diverticulosis. One active bleeding diverticula was identified and treated with hemoclipping and Epinephrine. The patient tolerated the procedure well. The patient required blood transfusion. He was stabilized and discharged on August 6, 2019. The patient denies any significant family history of GI problems.  [de-identified] : (+) prior smoking 1 1/2 to 2 PPD x 20 years stopped x 46 years, (-) ETOH, (-) IVDA\par

## 2020-12-08 PROCEDURE — 99283 EMERGENCY DEPT VISIT LOW MDM: CPT

## 2020-12-09 ENCOUNTER — EMERGENCY (EMERGENCY)
Facility: HOSPITAL | Age: 80
LOS: 1 days | Discharge: HOME | End: 2020-12-09
Attending: STUDENT IN AN ORGANIZED HEALTH CARE EDUCATION/TRAINING PROGRAM
Payer: MEDICARE

## 2020-12-09 VITALS
SYSTOLIC BLOOD PRESSURE: 159 MMHG | OXYGEN SATURATION: 96 % | TEMPERATURE: 98 F | HEART RATE: 118 BPM | WEIGHT: 179.9 LBS | DIASTOLIC BLOOD PRESSURE: 81 MMHG | RESPIRATION RATE: 20 BRPM | HEIGHT: 70 IN

## 2020-12-09 VITALS — HEART RATE: 93 BPM | OXYGEN SATURATION: 95 % | RESPIRATION RATE: 18 BRPM

## 2020-12-09 PROCEDURE — 99282 EMERGENCY DEPT VISIT SF MDM: CPT

## 2020-12-09 NOTE — ED PROVIDER NOTE - NSFOLLOWUPINSTRUCTIONS_ED_ALL_ED_FT
You were seen in the emergency department for fever due to Covid-19.     Please follow-up with your primary care doctor in the next 24-48 hours.     If you have any worsening symptoms, severe chest pain, shortness of breath, headache, or weakness on one side of your body, please return to the emergency department.

## 2020-12-09 NOTE — ED PROVIDER NOTE - CLINICAL SUMMARY MEDICAL DECISION MAKING FREE TEXT BOX
80M presenting with fever. day 4 of covid symptoms. took tylenol this morning so no fever in ED. patient breathing comfortably on room air. discussed return precautions. will discharge.

## 2020-12-09 NOTE — ED PROVIDER NOTE - OBJECTIVE STATEMENT
80M, pmh of htn, diverticulosis, prostate cancer, presenting with fever. four days ago the patient developed a dry cough and fever, and was diagnosed with covid-19. today he reports he had a fever to 101, had a heart rate to 88 and his oxygen level was 94. denies any headache, nausea, vomiting, abdominal pain, pain or burning with urination.

## 2020-12-09 NOTE — ED ADULT TRIAGE NOTE - CHIEF COMPLAINT QUOTE
I tested + for covid on Sunday and I have been with low grade fever and this morning I have a fever of 101.6 F

## 2020-12-09 NOTE — ED PROVIDER NOTE - PMH
Diverticulosis    H/O gastroesophageal reflux (GERD)    HTN (hypertension)    Prostate cancer    Skipped beats

## 2020-12-09 NOTE — ED PROVIDER NOTE - PATIENT PORTAL LINK FT
You can access the FollowMyHealth Patient Portal offered by [facility] by registering at the following website: [email or web address]. By joining "[employer]’s FollowMyHealth portal, you will also be able to view your health information using other applications (apps) compatible with our system.

## 2021-04-14 ENCOUNTER — APPOINTMENT (OUTPATIENT)
Dept: UROLOGY | Facility: CLINIC | Age: 81
End: 2021-04-14
Payer: MEDICARE

## 2021-04-14 ENCOUNTER — APPOINTMENT (OUTPATIENT)
Dept: UROLOGY | Facility: CLINIC | Age: 81
End: 2021-04-14

## 2021-04-14 PROCEDURE — 99072 ADDL SUPL MATRL&STAF TM PHE: CPT

## 2021-04-14 PROCEDURE — 99214 OFFICE O/P EST MOD 30 MIN: CPT

## 2021-04-14 PROCEDURE — 76775 US EXAM ABDO BACK WALL LIM: CPT

## 2021-04-27 NOTE — HISTORY OF PRESENT ILLNESS
[FreeTextEntry1] : cc f/u prostate ca\par [place] 4+3 prostate ca\par \par S/p xrt \par Completed 6/17\par Doing well\par urination improved on tamsulosin but stopped neil to congestion rapaflo still gave orthostatic\par poor erections worsening last year difficulty maintaining  \par ok results 80 mg sildenafil libido decreased - test low but no current partner\par \par \par recent ct showed britney indetermiante renal lesion\par f/u mri 4.5 cm bosniak 2f \par \par \par \par 3mo ago \par (1/19/21)\par \par 9mo ago \par (7/27/20)\par \par 1yr ago \par (1/28/20)\par \par 1yr ago \par (8/8/19)\par \par 6yr ago \par (11/18/14)\par \par 7yr ago \par (11/14/13)\par \par 8yr ago \par (9/27/12)\par \par  PSA, Total Screen, Medicare < OR = 4.0 ng/mL 0.2  0.2 R, CM  0.2 R, CM  0.2 R, CM  2.9 R, CM  3.4 R, CM  2.5 R, CM  \par

## 2021-04-27 NOTE — ASSESSMENT
[FreeTextEntry1] : cap\par forrest  ps astable\par \par renal cyst \par stable \par f/u sono 6 months \par bph cont tamsulosin

## 2021-05-24 ENCOUNTER — APPOINTMENT (OUTPATIENT)
Dept: GASTROENTEROLOGY | Facility: CLINIC | Age: 81
End: 2021-05-24

## 2021-10-28 ENCOUNTER — APPOINTMENT (OUTPATIENT)
Dept: UROLOGY | Facility: CLINIC | Age: 81
End: 2021-10-28
Payer: MEDICARE

## 2021-10-28 PROCEDURE — 76775 US EXAM ABDO BACK WALL LIM: CPT

## 2021-10-28 PROCEDURE — 99213 OFFICE O/P EST LOW 20 MIN: CPT | Mod: 25

## 2021-11-01 NOTE — HISTORY OF PRESENT ILLNESS
[FreeTextEntry1] : cc f/u prostate ca\par [place] 4+3 prostate ca\par \par S/p xrt \par Completed 6/17\par Doing well\par ps 0.2 7/21\par urination improved on tamsulosin but stopped neil to congestion rapaflo still gave orthostatic\par poor erections worsening last year difficulty maintaining  \par ok results 80 mg sildenafil libido decreased - test low but no current partner\par \par \par  ct showed britney indetermiante renal lesion\par f/u mri 4.5 cm bosniak 2f \par \par \par \par 3mo ago \par (1/19/21)\par \par 9mo ago \par (7/27/20)\par \par 1yr ago \par (1/28/20)\par \par 1yr ago \par (8/8/19)\par \par 6yr ago \par (11/18/14)\par \par 7yr ago \par (11/14/13)\par \par 8yr ago \par (9/27/12)\par \par  PSA, Total Screen, Medicare < OR = 4.0 ng/mL 0.2  0.2 R, CM  0.2 R, CM  0.2 R, CM  2.9 R, CM  3.4 R, CM  2.5 R, CM  \par

## 2022-04-28 ENCOUNTER — APPOINTMENT (OUTPATIENT)
Dept: UROLOGY | Facility: CLINIC | Age: 82
End: 2022-04-28
Payer: MEDICARE

## 2022-04-28 PROCEDURE — 76775 US EXAM ABDO BACK WALL LIM: CPT

## 2022-04-28 PROCEDURE — 99213 OFFICE O/P EST LOW 20 MIN: CPT | Mod: 25

## 2022-04-28 NOTE — HISTORY OF PRESENT ILLNESS
[FreeTextEntry1] : cc f/u prostate ca/renal lesion \par [place] 4+3 prostate ca\par \par S/p xrt \par Completed 6/17\par Doing well\par ps 0.2 7/21\par urination improved on tamsulosin but stopped neil to congestion rapaflo still gave orthostatic\par poor erections worsening last year difficulty maintaining  \par ok results 80 mg sildenafil libido decreased - test low but no current partner\par \par \par  ct showed britney indetermiante renal lesion\par f/u mri 4.5 cm bosniak 2f \par f/u sono stable lesion \par \par \par recent psa 0.22\par \par  PSA, Total Screen, Medicare < OR = 4.0 ng/mL 0.2  0.2 R, CM  0.2 R, CM  0.2 R, CM  2.9 R, CM  3.4 R, CM  2.5 R, CM  \par

## 2022-04-28 NOTE — ASSESSMENT
[FreeTextEntry1] : cap \par stable psa \par repeat 6 months then yearly if stable \par \par renal lesion \par sono images reviewed no significant change \par f/u sono 6 months \par \par stable voidng \par

## 2022-11-02 ENCOUNTER — APPOINTMENT (OUTPATIENT)
Dept: UROLOGY | Facility: CLINIC | Age: 82
End: 2022-11-02

## 2022-11-02 PROCEDURE — 76775 US EXAM ABDO BACK WALL LIM: CPT

## 2022-11-02 PROCEDURE — 99214 OFFICE O/P EST MOD 30 MIN: CPT

## 2022-11-02 NOTE — HISTORY OF PRESENT ILLNESS
[FreeTextEntry1] : cc f/u prostate ca/renal lesion \par [place] 4+3 prostate ca\par \par S/p xrt \par Completed 6/17\par Doing well\par psa 0.18 7/22\par ps 0.2 7/21\par urination improved on tamsulosin but stopped neil to congestion rapaflo still gave orthostatic\par poor erections worsening last year difficulty maintaining  \par ok results 80 mg sildenafil libido decreased - test low but no current partner\par \par \par  ct showed britney indetermiante renal lesion\par f/u mri 4.5 cm bosniak 2f \par f/u sono stable lesion 11/22\par \par \par recent psa 0.22\par \par  PSA, Total Screen, Medicare < OR = 4.0 ng/mL 0.2  0.2 R, CM  0.2 R, CM  0.2 R, CM  2.9 R, CM  3.4 R, CM  2.5 R, CM  \par

## 2022-11-02 NOTE — ASSESSMENT
[FreeTextEntry1] : cap\par psa low stable \par con routine psa\par \par ed \par cont sildenafil  as needed\par \par bph intolerant of alpha blockers\par does not want other meds\par \par \par stable renal cyst\par f/u sono 6 months\par \par

## 2023-05-03 ENCOUNTER — APPOINTMENT (OUTPATIENT)
Dept: UROLOGY | Facility: CLINIC | Age: 83
End: 2023-05-03

## 2023-05-03 ENCOUNTER — APPOINTMENT (OUTPATIENT)
Dept: UROLOGY | Facility: CLINIC | Age: 83
End: 2023-05-03
Payer: MEDICARE

## 2023-05-03 PROCEDURE — 76775 US EXAM ABDO BACK WALL LIM: CPT

## 2023-05-03 PROCEDURE — 99214 OFFICE O/P EST MOD 30 MIN: CPT

## 2023-05-15 NOTE — HISTORY OF PRESENT ILLNESS
[FreeTextEntry1] : cc f/u prostate ca/renal lesion \par [place] 4+3 prostate ca\par \par S/p xrt \par Completed 6/17\par Doing well \par psa 0.18 1/23\par psa 0.18 7/22\par ps 0.2 7/21\par urination improved on tamsulosin but stopped neil to congestion rapaflo still gave orthostatic\par poor erections worsening last year difficulty maintaining  \par ok results 80 mg sildenafil libido decreased - test low but no current partner\par \par \par  ct showed britney indetermiante renal lesion\par f/u mri 4.5 cm bosniak 2f \par f/u sono stable lesion 11/22\par \par \par \par  PSA, Total Screen, Medicare < OR = 4.0 ng/mL 0.2  0.2 R, CM  0.2 R, CM  0.2 R, CM  2.9 R, CM  3.4 R, CM  2.5 R, CM  \par

## 2023-05-15 NOTE — ASSESSMENT
[FreeTextEntry1] : cap \par doing well\par stable psa \par \par ed \par cont sidlenafil \par \par \par sono images reviewed \par stable cyst \par f/u imaging 6 months

## 2024-05-08 ENCOUNTER — APPOINTMENT (OUTPATIENT)
Dept: UROLOGY | Facility: CLINIC | Age: 84
End: 2024-05-08
Payer: MEDICARE

## 2024-05-08 VITALS
SYSTOLIC BLOOD PRESSURE: 138 MMHG | OXYGEN SATURATION: 98 % | WEIGHT: 178 LBS | TEMPERATURE: 97.9 F | DIASTOLIC BLOOD PRESSURE: 77 MMHG | HEART RATE: 86 BPM | BODY MASS INDEX: 25.48 KG/M2 | HEIGHT: 70 IN

## 2024-05-08 DIAGNOSIS — N28.9 DISORDER OF KIDNEY AND URETER, UNSPECIFIED: ICD-10-CM

## 2024-05-08 DIAGNOSIS — N40.1 BENIGN PROSTATIC HYPERPLASIA WITH LOWER URINARY TRACT SYMPMS: ICD-10-CM

## 2024-05-08 DIAGNOSIS — C61 MALIGNANT NEOPLASM OF PROSTATE: ICD-10-CM

## 2024-05-08 DIAGNOSIS — N52.9 MALE ERECTILE DYSFUNCTION, UNSPECIFIED: ICD-10-CM

## 2024-05-08 DIAGNOSIS — N13.8 BENIGN PROSTATIC HYPERPLASIA WITH LOWER URINARY TRACT SYMPMS: ICD-10-CM

## 2024-05-08 PROCEDURE — 99214 OFFICE O/P EST MOD 30 MIN: CPT

## 2024-05-08 PROCEDURE — G2211 COMPLEX E/M VISIT ADD ON: CPT

## 2024-05-16 PROBLEM — N28.9 RENAL LESION: Status: ACTIVE | Noted: 2020-01-29

## 2024-05-16 PROBLEM — C61 PROSTATE CANCER: Status: ACTIVE | Noted: 2017-12-13

## 2024-05-16 PROBLEM — N40.1 BENIGN LOCALIZED HYPERPLASIA OF PROSTATE WITH URINARY OBSTRUCTION: Status: ACTIVE | Noted: 2017-12-13

## 2024-05-16 PROBLEM — N52.9 ERECTILE DYSFUNCTION: Status: ACTIVE | Noted: 2018-10-12

## 2024-05-16 NOTE — HISTORY OF PRESENT ILLNESS
[FreeTextEntry1] : cc f/u prostate ca/renal lesion  [place] 4+3 prostate ca  S/p xrt  Completed 6/17 Doing well  psa 0.18 1/23 psa 0.18 7/22 ps 0.2 7/21 urination improved on tamsulosin but stopped neil to congestion rapaflo still gave orthostatic poor erections worsening last year difficulty maintaining   ok results 80 mg sildenafil libido decreased - test low but no current partner    ct showed brtiney indetermiante renal lesion f/u mri 4.5 cm bosniak 2f  f/u sono stable lesion 11/22  f/u sono 4/34  FINDINGS:   RIGHT KIDNEY: 10.6 cm L x 4.6 cm AP x 5.1 cm W. Normal size, morphology and cortical echotexture. No suspicious renal lesion. No hydronephrosis, shadowing calculi or perinephric fluid. Parapelvic cyst 4.2 x 3.2 x 4.8 cm. Septated midpole cyst 1.5 x 2.4 x 1.8 cm versus 2 separate small cysts. Upper pole cyst 2.0 x 1.9 x 2.7 cm.  LEFT KIDNEY: 9.7 cm L x 4.8 cm AP x 4.5 cm W. Normal size, morphology and cortical echotexture. No suspicious renal lesion. No hydronephrosis, shadowing calculi or perinephric fluid. Upper pole cyst 3.4 x 2.6 x 2.8 cm    PSA, Total Screen, Medicare < OR = 4.0 ng/mL 0.2  0.2 R, CM  0.2 R, CM  0.2 R, CM  2.9 R, CM  3.4 R, CM  2.5 R, CM

## 2024-05-16 NOTE — ASSESSMENT
[FreeTextEntry1] : cap  psa low stable  con routine psa  ed cont sildenafil as needed   bph intolerant of alpha blockers does not want other meds  sono images reviewed stable renal cyst     Patient is being seen today for evaluation and management of a chronic and longitudinal ongoing condition and I am of the primary treating physician.

## 2025-01-02 ENCOUNTER — NON-APPOINTMENT (OUTPATIENT)
Age: 85
End: 2025-01-02

## 2025-09-20 ENCOUNTER — NON-APPOINTMENT (OUTPATIENT)
Age: 85
End: 2025-09-20